# Patient Record
Sex: FEMALE | Race: WHITE | Employment: FULL TIME | URBAN - METROPOLITAN AREA
[De-identification: names, ages, dates, MRNs, and addresses within clinical notes are randomized per-mention and may not be internally consistent; named-entity substitution may affect disease eponyms.]

---

## 2017-03-03 ENCOUNTER — EMPLOYEE WELLNESS (OUTPATIENT)
Dept: OTHER | Age: 42
End: 2017-03-03

## 2018-03-20 VITALS — WEIGHT: 233 LBS | BODY MASS INDEX: 38.77 KG/M2

## 2018-05-16 ENCOUNTER — EMPLOYEE WELLNESS (OUTPATIENT)
Dept: OTHER | Age: 43
End: 2018-05-16

## 2018-05-16 LAB
CHOLESTEROL, TOTAL: 231 MG/DL (ref 0–199)
GLUCOSE BLD-MCNC: 111 MG/DL (ref 74–107)
HDLC SERPL-MCNC: 40 MG/DL
LDL CHOLESTEROL CALCULATED: 152 MG/DL (ref 0–99)
TRIGL SERPL-MCNC: 194 MG/DL (ref 0–149)

## 2018-05-21 VITALS — WEIGHT: 221 LBS | BODY MASS INDEX: 36.78 KG/M2

## 2018-06-14 ENCOUNTER — OFFICE VISIT (OUTPATIENT)
Dept: FAMILY MEDICINE CLINIC | Age: 43
End: 2018-06-14
Payer: COMMERCIAL

## 2018-06-14 VITALS
HEART RATE: 91 BPM | TEMPERATURE: 97.3 F | OXYGEN SATURATION: 97 % | DIASTOLIC BLOOD PRESSURE: 73 MMHG | BODY MASS INDEX: 36.49 KG/M2 | HEIGHT: 65 IN | WEIGHT: 219 LBS | SYSTOLIC BLOOD PRESSURE: 123 MMHG

## 2018-06-14 DIAGNOSIS — Z12.31 ENCOUNTER FOR SCREENING MAMMOGRAM FOR BREAST CANCER: ICD-10-CM

## 2018-06-14 DIAGNOSIS — R73.03 PREDIABETES: Primary | ICD-10-CM

## 2018-06-14 DIAGNOSIS — E66.09 CLASS 2 OBESITY DUE TO EXCESS CALORIES WITHOUT SERIOUS COMORBIDITY WITH BODY MASS INDEX (BMI) OF 36.0 TO 36.9 IN ADULT: ICD-10-CM

## 2018-06-14 LAB — HBA1C MFR BLD: 6.2 %

## 2018-06-14 PROCEDURE — 83036 HEMOGLOBIN GLYCOSYLATED A1C: CPT | Performed by: FAMILY MEDICINE

## 2018-06-14 PROCEDURE — 99213 OFFICE O/P EST LOW 20 MIN: CPT | Performed by: FAMILY MEDICINE

## 2018-06-14 ASSESSMENT — PATIENT HEALTH QUESTIONNAIRE - PHQ9
SUM OF ALL RESPONSES TO PHQ9 QUESTIONS 1 & 2: 0
SUM OF ALL RESPONSES TO PHQ QUESTIONS 1-9: 0
2. FEELING DOWN, DEPRESSED OR HOPELESS: 0
1. LITTLE INTEREST OR PLEASURE IN DOING THINGS: 0

## 2018-06-18 ASSESSMENT — ENCOUNTER SYMPTOMS
ABDOMINAL PAIN: 0
NAUSEA: 0
DIARRHEA: 0
COUGH: 0
VOMITING: 0
CONSTIPATION: 0
SHORTNESS OF BREATH: 0

## 2018-07-30 ENCOUNTER — HOSPITAL ENCOUNTER (OUTPATIENT)
Dept: GENERAL RADIOLOGY | Age: 43
Discharge: HOME | End: 2018-08-01
Payer: COMMERCIAL

## 2018-07-30 DIAGNOSIS — Z12.31 ENCOUNTER FOR SCREENING MAMMOGRAM FOR BREAST CANCER: ICD-10-CM

## 2018-07-30 PROCEDURE — 77063 BREAST TOMOSYNTHESIS BI: CPT

## 2019-03-21 ENCOUNTER — HOSPITAL ENCOUNTER (EMERGENCY)
Age: 44
Discharge: HOME | End: 2019-03-21
Payer: COMMERCIAL

## 2019-03-21 ENCOUNTER — NURSE TRIAGE (OUTPATIENT)
Dept: OTHER | Facility: CLINIC | Age: 44
End: 2019-03-21

## 2019-03-21 VITALS
DIASTOLIC BLOOD PRESSURE: 72 MMHG | OXYGEN SATURATION: 92 % | WEIGHT: 230 LBS | RESPIRATION RATE: 16 BRPM | BODY MASS INDEX: 38.32 KG/M2 | SYSTOLIC BLOOD PRESSURE: 134 MMHG | HEART RATE: 98 BPM | HEIGHT: 65 IN | TEMPERATURE: 98.4 F

## 2019-03-21 DIAGNOSIS — G44.039 EPISODIC PAROXYSMAL HEMICRANIA, NOT INTRACTABLE: Primary | ICD-10-CM

## 2019-03-21 DIAGNOSIS — R11.11 NON-INTRACTABLE VOMITING WITHOUT NAUSEA, UNSPECIFIED VOMITING TYPE: ICD-10-CM

## 2019-03-21 DIAGNOSIS — R11.0 NAUSEA: ICD-10-CM

## 2019-03-21 LAB
ANION GAP SERPL CALCULATED.3IONS-SCNC: 13 MMOL/L (ref 7–16)
BASOPHILS ABSOLUTE: 0.07 E9/L (ref 0–0.2)
BASOPHILS RELATIVE PERCENT: 1 % (ref 0–2)
BUN BLDV-MCNC: 16 MG/DL (ref 6–20)
CALCIUM SERPL-MCNC: 9.3 MG/DL (ref 8.6–10.2)
CHLORIDE BLD-SCNC: 101 MMOL/L (ref 98–107)
CHOLESTEROL, TOTAL: 262 MG/DL (ref 0–199)
CO2: 23 MMOL/L (ref 22–29)
CREAT SERPL-MCNC: 0.6 MG/DL (ref 0.5–1)
EOSINOPHILS ABSOLUTE: 0.29 E9/L (ref 0.05–0.5)
EOSINOPHILS RELATIVE PERCENT: 4 % (ref 0–6)
GFR AFRICAN AMERICAN: >60
GFR NON-AFRICAN AMERICAN: >60 ML/MIN/1.73
GLUCOSE BLD-MCNC: 234 MG/DL (ref 74–99)
HCT VFR BLD CALC: 42.7 % (ref 34–48)
HDLC SERPL-MCNC: 43 MG/DL
HEMOGLOBIN: 14.6 G/DL (ref 11.5–15.5)
IMMATURE GRANULOCYTES #: 0.04 E9/L
IMMATURE GRANULOCYTES %: 0.6 % (ref 0–5)
LDL CHOLESTEROL CALCULATED: 177 MG/DL (ref 0–99)
LYMPHOCYTES ABSOLUTE: 1.79 E9/L (ref 1.5–4)
LYMPHOCYTES RELATIVE PERCENT: 24.9 % (ref 20–42)
MCH RBC QN AUTO: 30.5 PG (ref 26–35)
MCHC RBC AUTO-ENTMCNC: 34.2 % (ref 32–34.5)
MCV RBC AUTO: 89.3 FL (ref 80–99.9)
MONOCYTES ABSOLUTE: 0.32 E9/L (ref 0.1–0.95)
MONOCYTES RELATIVE PERCENT: 4.4 % (ref 2–12)
NEUTROPHILS ABSOLUTE: 4.69 E9/L (ref 1.8–7.3)
NEUTROPHILS RELATIVE PERCENT: 65.1 % (ref 43–80)
PDW BLD-RTO: 12.3 FL (ref 11.5–15)
PLATELET # BLD: 357 E9/L (ref 130–450)
PMV BLD AUTO: 9.6 FL (ref 7–12)
POTASSIUM SERPL-SCNC: 4.3 MMOL/L (ref 3.5–5)
RBC # BLD: 4.78 E12/L (ref 3.5–5.5)
SODIUM BLD-SCNC: 137 MMOL/L (ref 132–146)
TRIGL SERPL-MCNC: 209 MG/DL (ref 0–149)
VLDLC SERPL CALC-MCNC: 42 MG/DL
WBC # BLD: 7.2 E9/L (ref 4.5–11.5)

## 2019-03-21 PROCEDURE — 80048 BASIC METABOLIC PNL TOTAL CA: CPT

## 2019-03-21 PROCEDURE — 99284 EMERGENCY DEPT VISIT MOD MDM: CPT

## 2019-03-21 PROCEDURE — 36415 COLL VENOUS BLD VENIPUNCTURE: CPT

## 2019-03-21 PROCEDURE — 96372 THER/PROPH/DIAG INJ SC/IM: CPT

## 2019-03-21 PROCEDURE — 96375 TX/PRO/DX INJ NEW DRUG ADDON: CPT

## 2019-03-21 PROCEDURE — 85025 COMPLETE CBC W/AUTO DIFF WBC: CPT

## 2019-03-21 PROCEDURE — 80061 LIPID PANEL: CPT

## 2019-03-21 PROCEDURE — 6360000002 HC RX W HCPCS: Performed by: NURSE PRACTITIONER

## 2019-03-21 PROCEDURE — 2500000003 HC RX 250 WO HCPCS: Performed by: NURSE PRACTITIONER

## 2019-03-21 PROCEDURE — 96374 THER/PROPH/DIAG INJ IV PUSH: CPT

## 2019-03-21 PROCEDURE — 2580000003 HC RX 258: Performed by: NURSE PRACTITIONER

## 2019-03-21 RX ORDER — PROMETHAZINE HYDROCHLORIDE 25 MG/1
25 TABLET ORAL EVERY 6 HOURS PRN
Qty: 20 TABLET | Refills: 0 | Status: SHIPPED | OUTPATIENT
Start: 2019-03-21 | End: 2019-03-26

## 2019-03-21 RX ORDER — 0.9 % SODIUM CHLORIDE 0.9 %
500 INTRAVENOUS SOLUTION INTRAVENOUS ONCE
Status: COMPLETED | OUTPATIENT
Start: 2019-03-21 | End: 2019-03-21

## 2019-03-21 RX ORDER — ONDANSETRON 2 MG/ML
8 INJECTION INTRAMUSCULAR; INTRAVENOUS ONCE
Status: COMPLETED | OUTPATIENT
Start: 2019-03-21 | End: 2019-03-21

## 2019-03-21 RX ORDER — KETOROLAC TROMETHAMINE 10 MG/1
10 TABLET, FILM COATED ORAL EVERY 6 HOURS PRN
Qty: 20 TABLET | Refills: 0 | Status: SHIPPED | OUTPATIENT
Start: 2019-03-21 | End: 2019-08-14

## 2019-03-21 RX ORDER — KETOROLAC TROMETHAMINE 30 MG/ML
30 INJECTION, SOLUTION INTRAMUSCULAR; INTRAVENOUS ONCE
Status: COMPLETED | OUTPATIENT
Start: 2019-03-21 | End: 2019-03-21

## 2019-03-21 RX ORDER — PROMETHAZINE HYDROCHLORIDE 25 MG/ML
25 INJECTION, SOLUTION INTRAMUSCULAR; INTRAVENOUS ONCE
Status: COMPLETED | OUTPATIENT
Start: 2019-03-21 | End: 2019-03-21

## 2019-03-21 RX ORDER — DIPHENHYDRAMINE HYDROCHLORIDE 50 MG/ML
25 INJECTION INTRAMUSCULAR; INTRAVENOUS ONCE
Status: COMPLETED | OUTPATIENT
Start: 2019-03-21 | End: 2019-03-21

## 2019-03-21 RX ADMIN — SODIUM CHLORIDE 500 ML: 9 INJECTION, SOLUTION INTRAVENOUS at 12:40

## 2019-03-21 RX ADMIN — DIPHENHYDRAMINE HYDROCHLORIDE 25 MG: 50 INJECTION, SOLUTION INTRAMUSCULAR; INTRAVENOUS at 12:40

## 2019-03-21 RX ADMIN — KETOROLAC TROMETHAMINE 30 MG: 30 INJECTION, SOLUTION INTRAMUSCULAR; INTRAVENOUS at 12:41

## 2019-03-21 RX ADMIN — PROMETHAZINE HYDROCHLORIDE 25 MG: 25 INJECTION INTRAMUSCULAR; INTRAVENOUS at 12:14

## 2019-03-21 RX ADMIN — ONDANSETRON HYDROCHLORIDE 8 MG: 2 SOLUTION INTRAMUSCULAR; INTRAVENOUS at 12:51

## 2019-03-21 RX ADMIN — FAMOTIDINE 20 MG: 10 INJECTION, SOLUTION INTRAVENOUS at 12:40

## 2019-03-21 ASSESSMENT — PAIN SCALES - GENERAL
PAINLEVEL_OUTOF10: 6
PAINLEVEL_OUTOF10: 6
PAINLEVEL_OUTOF10: 0

## 2019-03-21 ASSESSMENT — PAIN DESCRIPTION - PAIN TYPE: TYPE: ACUTE PAIN

## 2019-03-21 ASSESSMENT — PAIN DESCRIPTION - DESCRIPTORS: DESCRIPTORS: ACHING

## 2019-03-21 ASSESSMENT — PAIN DESCRIPTION - LOCATION: LOCATION: HEAD

## 2019-06-07 ENCOUNTER — HOSPITAL ENCOUNTER (EMERGENCY)
Age: 44
Discharge: HOME | End: 2019-06-07
Payer: COMMERCIAL

## 2019-06-07 ENCOUNTER — NURSE TRIAGE (OUTPATIENT)
Dept: OTHER | Facility: CLINIC | Age: 44
End: 2019-06-07

## 2019-06-07 VITALS
WEIGHT: 230 LBS | RESPIRATION RATE: 20 BRPM | BODY MASS INDEX: 38.32 KG/M2 | HEART RATE: 109 BPM | DIASTOLIC BLOOD PRESSURE: 90 MMHG | SYSTOLIC BLOOD PRESSURE: 164 MMHG | HEIGHT: 65 IN | TEMPERATURE: 97.8 F | OXYGEN SATURATION: 97 %

## 2019-06-07 DIAGNOSIS — Z91.030 BEE STING ALLERGY: Primary | ICD-10-CM

## 2019-06-07 PROCEDURE — 6360000002 HC RX W HCPCS: Performed by: NURSE PRACTITIONER

## 2019-06-07 PROCEDURE — 96375 TX/PRO/DX INJ NEW DRUG ADDON: CPT

## 2019-06-07 PROCEDURE — 2500000003 HC RX 250 WO HCPCS: Performed by: NURSE PRACTITIONER

## 2019-06-07 PROCEDURE — 99281 EMR DPT VST MAYX REQ PHY/QHP: CPT

## 2019-06-07 PROCEDURE — 2580000003 HC RX 258: Performed by: NURSE PRACTITIONER

## 2019-06-07 PROCEDURE — 96374 THER/PROPH/DIAG INJ IV PUSH: CPT

## 2019-06-07 RX ORDER — FAMOTIDINE 20 MG/1
20 TABLET, FILM COATED ORAL 2 TIMES DAILY
Qty: 14 TABLET | Refills: 0 | Status: SHIPPED | OUTPATIENT
Start: 2019-06-07 | End: 2019-08-14

## 2019-06-07 RX ORDER — DIPHENHYDRAMINE HYDROCHLORIDE 50 MG/ML
25 INJECTION INTRAMUSCULAR; INTRAVENOUS ONCE
Status: COMPLETED | OUTPATIENT
Start: 2019-06-07 | End: 2019-06-07

## 2019-06-07 RX ORDER — DEXAMETHASONE SODIUM PHOSPHATE 10 MG/ML
10 INJECTION INTRAMUSCULAR; INTRAVENOUS ONCE
Status: COMPLETED | OUTPATIENT
Start: 2019-06-07 | End: 2019-06-07

## 2019-06-07 RX ORDER — PREDNISONE 10 MG/1
40 TABLET ORAL DAILY
Qty: 20 TABLET | Refills: 0 | Status: SHIPPED | OUTPATIENT
Start: 2019-06-07 | End: 2019-06-12

## 2019-06-07 RX ORDER — DIPHENHYDRAMINE HCL 25 MG
25 CAPSULE ORAL EVERY 6 HOURS PRN
Qty: 20 CAPSULE | Refills: 0 | Status: SHIPPED | OUTPATIENT
Start: 2019-06-07 | End: 2019-06-17

## 2019-06-07 RX ORDER — 0.9 % SODIUM CHLORIDE 0.9 %
1000 INTRAVENOUS SOLUTION INTRAVENOUS ONCE
Status: COMPLETED | OUTPATIENT
Start: 2019-06-07 | End: 2019-06-07

## 2019-06-07 RX ADMIN — FAMOTIDINE 20 MG: 10 INJECTION, SOLUTION INTRAVENOUS at 12:47

## 2019-06-07 RX ADMIN — DIPHENHYDRAMINE HYDROCHLORIDE 25 MG: 50 INJECTION, SOLUTION INTRAMUSCULAR; INTRAVENOUS at 12:47

## 2019-06-07 RX ADMIN — DEXAMETHASONE SODIUM PHOSPHATE 10 MG: 10 INJECTION INTRAMUSCULAR; INTRAVENOUS at 12:47

## 2019-06-07 RX ADMIN — SODIUM CHLORIDE 1000 ML: 9 INJECTION, SOLUTION INTRAVENOUS at 12:47

## 2019-06-07 NOTE — TELEPHONE ENCOUNTER
Reason for Disposition   Wheezing or difficulty breathing    Protocols used: BEE STING-ADULT-OH    Spoke with pt directly regarding a reported wasp sting. States that sting was around 20- 30 minutes ago. Pt has known allergy to bee venom. Originally denies difficulty breathing and chest pain but as writer was speaking with caller her breathing began to sound very labored, increased and slightly wheezing. Pt verbalizes that she is having an increased in difficulty breathing. Writer advises to hang up and call 911. Caller also instructed to use epi pen as prescribed. Pt states that her  as well as children are with her at this time. When called back, caller had dialed EMS.

## 2019-06-07 NOTE — ED PROVIDER NOTES
[facility]     Department of Emergency Medicine   ED  Provider Note  Admit Date/RoomTime: 2019 12:22 PM  ED Room: /   Chief Complaint   Insect Bite (pt with bee sting about 1 1/2 hrs ago to right side shoulder area- gave epi at 1045. no sob noted. pulse ox 98% on room air. )    History of Present Illness   Source of history provided by:  patient. History/Exam Limitations: none. Maira Martinez is a 39 y.o. old female who has a past medical history of:   Past Medical History:   Diagnosis Date    Acute medial meniscus tear of right knee 6/3/2015    MRI 2015     Anxiety     Depression     Depression     Marital problems. Previously on Lexapro.  Headache(784.0)     HTN     Previously on medication.  HTN     Migraine     Preeclampsia     Previous 2 pregnancies.  Preeclampsia     presents to the emergency department by private vehicle, for a wasp bite to right axilla, which occured 2 hour(s) prior to arrival.  The complaint is associated with hives. Since onset the symptoms have been persistent. She denies difficulty breathing, difficulty swallowing, wheezing, throat tightness or hoarseness. The patient has a history of no similar reactions. Tetanus Status:  up to date. Additional Symptoms:      Drainage:   no.     Abrasion:   no.     Pustule:   no.     Puncture:   yes. ROS    Pertinent positives and negatives are stated within HPI, all other systems reviewed and are negative. Past Surgical History:  has a past surgical history that includes  section (); Tubal ligation; Tubal ligation (); LEEP (); and Hysterectomy. Social History:  reports that she has never smoked. She has never used smokeless tobacco. She reports that she does not drink alcohol or use drugs.   Family History: family history includes Breast Cancer in her mother; Cancer in her father; Cancer (age of onset: 32) in her sister; Diabetes in her mother; Heart Disease (age to providing specific details for the plan of care and counseling regarding the diagnosis and prognosis. Questions are answered at this time and they are agreeable with the plan. Assessment      1. Bee sting allergy      Plan   Discharge to home  Patient condition is stable    New Medications     New Prescriptions    No medications on file     Electronically signed by GERALDINE Barreto CNP   DD: 6/7/19  **This report was transcribed using voice recognition software. Every effort was made to ensure accuracy; however, inadvertent computerized transcription errors may be present.   END OF ED PROVIDER NOTE        GERALDINE Barreto CNP  06/07/19 [address] GERALDINE Nash CNP  06/07/19 [id number]

## 2019-06-30 ENCOUNTER — NURSE TRIAGE (OUTPATIENT)
Dept: OTHER | Facility: CLINIC | Age: 44
End: 2019-06-30

## 2019-06-30 NOTE — TELEPHONE ENCOUNTER
Reason for Disposition   [1] Continuous (nonstop) coughing interferes with work or school AND [2] no improvement using cough treatment per Care Advice    Protocols used: COUGH - ACUTE PRODUCTIVE-ADULT-AH    Caller reports that she began with a productive cough that began two days ago.  Provided local urgent cares in area per patient request.

## 2019-07-12 ENCOUNTER — OFFICE VISIT (OUTPATIENT)
Dept: FAMILY MEDICINE CLINIC | Age: 44
End: 2019-07-12
Payer: COMMERCIAL

## 2019-07-12 VITALS
WEIGHT: 230 LBS | RESPIRATION RATE: 16 BRPM | HEIGHT: 65 IN | TEMPERATURE: 97.6 F | BODY MASS INDEX: 38.32 KG/M2 | OXYGEN SATURATION: 95 % | SYSTOLIC BLOOD PRESSURE: 139 MMHG | DIASTOLIC BLOOD PRESSURE: 84 MMHG | HEART RATE: 107 BPM

## 2019-07-12 DIAGNOSIS — E66.09 CLASS 2 OBESITY DUE TO EXCESS CALORIES WITHOUT SERIOUS COMORBIDITY WITH BODY MASS INDEX (BMI) OF 38.0 TO 38.9 IN ADULT: ICD-10-CM

## 2019-07-12 DIAGNOSIS — Z78.9 IMMUNE TO HEPATITIS B: ICD-10-CM

## 2019-07-12 DIAGNOSIS — Z11.3 SCREEN FOR STD (SEXUALLY TRANSMITTED DISEASE): ICD-10-CM

## 2019-07-12 DIAGNOSIS — R05.8 POST-VIRAL COUGH SYNDROME: Primary | ICD-10-CM

## 2019-07-12 DIAGNOSIS — Z11.4 ENCOUNTER FOR SCREENING FOR HIV: ICD-10-CM

## 2019-07-12 DIAGNOSIS — Z78.9 IMMUNE TO VARICELLA: ICD-10-CM

## 2019-07-12 DIAGNOSIS — Z11.1 VISIT FOR TB SKIN TEST: ICD-10-CM

## 2019-07-12 DIAGNOSIS — E07.89 THYROID FULLNESS: ICD-10-CM

## 2019-07-12 DIAGNOSIS — Z23 NEED FOR DIPHTHERIA-TETANUS-PERTUSSIS (TDAP) VACCINE: ICD-10-CM

## 2019-07-12 DIAGNOSIS — Z78.9 HEPATITIS A IMMUNE: ICD-10-CM

## 2019-07-12 PROCEDURE — 99212 OFFICE O/P EST SF 10 MIN: CPT | Performed by: STUDENT IN AN ORGANIZED HEALTH CARE EDUCATION/TRAINING PROGRAM

## 2019-07-12 PROCEDURE — 6360000002 HC RX W HCPCS

## 2019-07-12 PROCEDURE — 90715 TDAP VACCINE 7 YRS/> IM: CPT

## 2019-07-12 PROCEDURE — 99396 PREV VISIT EST AGE 40-64: CPT | Performed by: STUDENT IN AN ORGANIZED HEALTH CARE EDUCATION/TRAINING PROGRAM

## 2019-07-12 PROCEDURE — 90471 IMMUNIZATION ADMIN: CPT

## 2019-07-12 RX ORDER — BENZONATATE 100 MG/1
100 CAPSULE ORAL 3 TIMES DAILY PRN
Qty: 30 CAPSULE | Refills: 0 | Status: SHIPPED | OUTPATIENT
Start: 2019-07-12 | End: 2019-07-19

## 2019-07-13 ASSESSMENT — ENCOUNTER SYMPTOMS
WHEEZING: 0
DIARRHEA: 0
SHORTNESS OF BREATH: 0
SORE THROAT: 0
TROUBLE SWALLOWING: 0
VOICE CHANGE: 0
RHINORRHEA: 1
CONSTIPATION: 0
COUGH: 1

## 2019-07-15 ENCOUNTER — NURSE ONLY (OUTPATIENT)
Dept: FAMILY MEDICINE CLINIC | Age: 44
End: 2019-07-15
Payer: COMMERCIAL

## 2019-07-15 ENCOUNTER — HOSPITAL ENCOUNTER (OUTPATIENT)
Age: 44
Discharge: HOME | End: 2019-07-15
Payer: COMMERCIAL

## 2019-07-15 DIAGNOSIS — Z78.9 IMMUNE TO VARICELLA: ICD-10-CM

## 2019-07-15 DIAGNOSIS — E66.09 CLASS 2 OBESITY DUE TO EXCESS CALORIES WITHOUT SERIOUS COMORBIDITY WITH BODY MASS INDEX (BMI) OF 38.0 TO 38.9 IN ADULT: ICD-10-CM

## 2019-07-15 DIAGNOSIS — Z78.9 HEPATITIS A IMMUNE: ICD-10-CM

## 2019-07-15 DIAGNOSIS — Z78.9 IMMUNE TO HEPATITIS B: ICD-10-CM

## 2019-07-15 DIAGNOSIS — E07.89 THYROID FULLNESS: ICD-10-CM

## 2019-07-15 DIAGNOSIS — Z11.3 SCREEN FOR STD (SEXUALLY TRANSMITTED DISEASE): ICD-10-CM

## 2019-07-15 DIAGNOSIS — Z11.4 ENCOUNTER FOR SCREENING FOR HIV: ICD-10-CM

## 2019-07-15 LAB
HBA1C MFR BLD: 7.2 % (ref 4–5.6)
RPR: NORMAL
TSH SERPL DL<=0.05 MIU/L-ACNC: 0.92 UIU/ML (ref 0.27–4.2)

## 2019-07-15 PROCEDURE — 86317 IMMUNOASSAY INFECTIOUS AGENT: CPT

## 2019-07-15 PROCEDURE — 36415 COLL VENOUS BLD VENIPUNCTURE: CPT

## 2019-07-15 PROCEDURE — 86703 HIV-1/HIV-2 1 RESULT ANTBDY: CPT

## 2019-07-15 PROCEDURE — 84443 ASSAY THYROID STIM HORMONE: CPT

## 2019-07-15 PROCEDURE — 86592 SYPHILIS TEST NON-TREP QUAL: CPT

## 2019-07-15 PROCEDURE — 86709 HEPATITIS A IGM ANTIBODY: CPT

## 2019-07-15 PROCEDURE — 86787 VARICELLA-ZOSTER ANTIBODY: CPT

## 2019-07-15 PROCEDURE — 83036 HEMOGLOBIN GLYCOSYLATED A1C: CPT

## 2019-07-16 LAB
HAV IGM SER IA-ACNC: NORMAL
HIV-1 AND HIV-2 ANTIBODIES: NORMAL
VARICELLA-ZOSTER VIRUS AB, IGG: NORMAL

## 2019-07-18 LAB
Lab: NORMAL
REPORT: NORMAL
THIS TEST SENT TO: NORMAL

## 2019-08-14 ENCOUNTER — OFFICE VISIT (OUTPATIENT)
Dept: FAMILY MEDICINE CLINIC | Age: 44
End: 2019-08-14
Payer: COMMERCIAL

## 2019-08-14 VITALS
DIASTOLIC BLOOD PRESSURE: 79 MMHG | BODY MASS INDEX: 38.15 KG/M2 | WEIGHT: 229 LBS | TEMPERATURE: 98.4 F | HEART RATE: 100 BPM | OXYGEN SATURATION: 96 % | HEIGHT: 65 IN | SYSTOLIC BLOOD PRESSURE: 116 MMHG

## 2019-08-14 DIAGNOSIS — Z12.39 BREAST CANCER SCREENING: ICD-10-CM

## 2019-08-14 DIAGNOSIS — Z01.84 IMMUNITY STATUS TESTING: ICD-10-CM

## 2019-08-14 DIAGNOSIS — R05.8 POST-VIRAL COUGH SYNDROME: ICD-10-CM

## 2019-08-14 DIAGNOSIS — R73.09 ELEVATED HEMOGLOBIN A1C: Primary | ICD-10-CM

## 2019-08-14 PROCEDURE — 99212 OFFICE O/P EST SF 10 MIN: CPT | Performed by: STUDENT IN AN ORGANIZED HEALTH CARE EDUCATION/TRAINING PROGRAM

## 2019-08-14 PROCEDURE — 99213 OFFICE O/P EST LOW 20 MIN: CPT | Performed by: STUDENT IN AN ORGANIZED HEALTH CARE EDUCATION/TRAINING PROGRAM

## 2019-08-14 ASSESSMENT — ENCOUNTER SYMPTOMS
ABDOMINAL PAIN: 0
SHORTNESS OF BREATH: 0
COUGH: 1
WHEEZING: 0
VOMITING: 0
DIARRHEA: 0

## 2019-08-14 NOTE — PROGRESS NOTES
daily walk after dinner as a means to start out increase activity. Patient discussed with me that she has access to our employee gym, and that she is willing to incorporate walking at lunchtime while the weather is nice. I expressed to her the importance of eating more vegetables, decreasing carbohydrates and processed sugars. Patient expressed understanding. Immunity status testing  -     Measles/Mumps/Rubella Immunity; Future    Breast cancer screening  -     JAYDE DIGITAL SCREEN W CAD BILATERAL; Future    Post-viral cough syndrome   - Continue hydration with water and use cough drops as needed. Maintenance     Health Maintenance Due   Topic Date Due    Diabetic foot exam  10/27/1984    Diabetic retinal exam  10/27/1984    Diabetic microalbuminuria test  10/27/1992    Breast cancer screen  07/30/2019          Reviewed age and gender appropriate health screening exams and vaccinations. Advised patient regarding importance of keeping up withrecommended health maintenance and to schedule as soon as possible if overdue, as this is important in assessing for undiagnosed pathology, especially cancer, as well as protecting against potentially harmful/lifethreatening disease. Refilled all appropriate medications today. RTO in 4m    No future appointments. Signed by : Yonatan Lyles M.D., PGY-2    This case was discussedwith Dr Eric Rodriguez (s)        This note was dictated with Dragon Software. This note, in part or full,may have been transcribed using voice recognition software. Every effort was made to ensure accuracy; however, inadvertent computerized transcription errors may be present. Please excuse any transcriptional grammatical or spelling errors that may have escaped my editorial review.

## 2019-08-15 ENCOUNTER — HOSPITAL ENCOUNTER (OUTPATIENT)
Age: 44
Discharge: HOME | End: 2019-08-15
Payer: COMMERCIAL

## 2019-08-15 DIAGNOSIS — Z01.84 IMMUNITY STATUS TESTING: ICD-10-CM

## 2019-08-15 PROCEDURE — 86735 MUMPS ANTIBODY: CPT

## 2019-08-15 PROCEDURE — 86765 RUBEOLA ANTIBODY: CPT

## 2019-08-15 PROCEDURE — 86762 RUBELLA ANTIBODY: CPT

## 2019-08-15 PROCEDURE — 36415 COLL VENOUS BLD VENIPUNCTURE: CPT

## 2019-08-20 LAB
MEASLES IMMUNE (IGG): NORMAL
MUMPS AB IGG: NORMAL
RUBELLA ANTIBODY IGG: NORMAL

## 2019-08-22 ENCOUNTER — TELEPHONE (OUTPATIENT)
Dept: FAMILY MEDICINE CLINIC | Age: 44
End: 2019-08-22

## 2019-08-22 DIAGNOSIS — Z01.84 LACK OF IMMUNITY TO HEPATITIS B VIRUS DEMONSTRATED BY SEROLOGIC TEST: Primary | ICD-10-CM

## 2019-08-23 ENCOUNTER — NURSE ONLY (OUTPATIENT)
Dept: FAMILY MEDICINE CLINIC | Age: 44
End: 2019-08-23
Payer: COMMERCIAL

## 2019-08-23 DIAGNOSIS — Z11.1 PPD SCREENING TEST: Primary | ICD-10-CM

## 2019-08-23 DIAGNOSIS — Z23 NEED FOR HEPATITIS B VACCINATION: ICD-10-CM

## 2019-08-23 PROCEDURE — G0010 ADMIN HEPATITIS B VACCINE: HCPCS

## 2019-08-23 PROCEDURE — 90740 HEPB VACC 3 DOSE IMMUNSUP IM: CPT

## 2019-08-23 PROCEDURE — 99211 OFF/OP EST MAY X REQ PHY/QHP: CPT

## 2019-08-23 PROCEDURE — 6360000002 HC RX W HCPCS

## 2019-08-23 NOTE — PROGRESS NOTES
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

## 2019-08-26 ENCOUNTER — NURSE ONLY (OUTPATIENT)
Dept: FAMILY MEDICINE CLINIC | Age: 44
End: 2019-08-26
Payer: COMMERCIAL

## 2019-08-26 DIAGNOSIS — Z11.1 TUBERCULOSIS SCREENING: ICD-10-CM

## 2019-08-26 PROCEDURE — 99211 OFF/OP EST MAY X REQ PHY/QHP: CPT

## 2019-08-26 PROCEDURE — 86580 TB INTRADERMAL TEST: CPT

## 2019-08-28 ENCOUNTER — NURSE ONLY (OUTPATIENT)
Dept: FAMILY MEDICINE CLINIC | Age: 44
End: 2019-08-28
Payer: COMMERCIAL

## 2019-08-28 ENCOUNTER — TELEPHONE (OUTPATIENT)
Dept: FAMILY MEDICINE CLINIC | Age: 44
End: 2019-08-28

## 2019-08-28 DIAGNOSIS — Z11.1 SCREENING FOR TUBERCULOSIS: Primary | ICD-10-CM

## 2019-08-28 LAB
INDURATION: 0
TB SKIN TEST: NEGATIVE

## 2019-08-28 NOTE — TELEPHONE ENCOUNTER
Patient had step 1 TB test completed this week. Will need order for step 2 to be completed next week.

## 2019-09-16 ENCOUNTER — NURSE ONLY (OUTPATIENT)
Dept: FAMILY MEDICINE CLINIC | Age: 44
End: 2019-09-16
Payer: COMMERCIAL

## 2019-09-16 DIAGNOSIS — Z23 IMMUNIZATION DUE: ICD-10-CM

## 2019-09-16 PROCEDURE — 99211 OFF/OP EST MAY X REQ PHY/QHP: CPT

## 2019-09-16 PROCEDURE — 2500000003 HC RX 250 WO HCPCS

## 2019-09-16 PROCEDURE — 86580 TB INTRADERMAL TEST: CPT

## 2019-09-18 ENCOUNTER — NURSE ONLY (OUTPATIENT)
Dept: FAMILY MEDICINE CLINIC | Age: 44
End: 2019-09-18
Payer: COMMERCIAL

## 2019-09-18 DIAGNOSIS — Z11.1 SCREENING FOR TUBERCULOSIS: Primary | ICD-10-CM

## 2019-09-19 LAB
INDURATION: 0
TB SKIN TEST: NEGATIVE

## 2019-09-20 ENCOUNTER — OFFICE VISIT (OUTPATIENT)
Dept: FAMILY MEDICINE CLINIC | Age: 44
End: 2019-09-20
Payer: COMMERCIAL

## 2019-09-20 VITALS
DIASTOLIC BLOOD PRESSURE: 76 MMHG | HEART RATE: 106 BPM | SYSTOLIC BLOOD PRESSURE: 120 MMHG | OXYGEN SATURATION: 95 % | WEIGHT: 230 LBS | TEMPERATURE: 99 F | HEIGHT: 65 IN | BODY MASS INDEX: 38.32 KG/M2

## 2019-09-20 DIAGNOSIS — H00.014 HORDEOLUM EXTERNUM OF LEFT UPPER EYELID: Primary | ICD-10-CM

## 2019-09-20 PROCEDURE — 99213 OFFICE O/P EST LOW 20 MIN: CPT | Performed by: PHYSICIAN ASSISTANT

## 2019-09-20 RX ORDER — TOBRAMYCIN 3 MG/ML
SOLUTION/ DROPS OPHTHALMIC
Qty: 5 ML | Refills: 0 | Status: SHIPPED
Start: 2019-09-20 | End: 2020-09-08

## 2019-09-20 NOTE — PROGRESS NOTES
99 °F (37.2 °C) (Oral)   Ht 5' 5\" (1.651 m)   Wt 230 lb (104.3 kg)   LMP 03/01/2012   SpO2 95%   BMI 38.27 kg/m²    Oxygen Saturation Interpretation: Normal.    Constitutional:  Alert, development consistent with age. HENT:  NC/NT. Airway patent. Eyes:         Pupils: PERRL bilaterally. Eyelids:  Mild edema to the left upper lid. There is a small hordeolum noted over the left upper lid with mild TTP. Conjunctiva: No erythema noted to the conjunctiva. Sclera: Clear bilaterally. No obvious FB, rust ring, or hyphema. Cornea: No obvious corneal abrasion or ulceration bilaterally. EOM:  Intact bilaterally. Visual Acuity:  Grossly intact. Lungs: CTAB without wheezing, rales, or rhonchi  Heart: RRR, no murmurs, rubs, or gallops. Skin: Moist and warm without rashes or lesions. Lymphatics: No lymphangitis or adenopathy noted. Neurological:  Alert and oriented. Motor functions intact. Lab / Imaging Results   (All laboratory and radiology results have been personally reviewed by myself)    Imaging: All Radiology results interpreted by Radiologist unless otherwise noted. No orders to display         Assessment / Plan     Impression(s):  1. Hordeolum externum of left upper eyelid      Disposition:  Disposition: Discharge to home. Script written for tobramycin ophthalmic drops, side effects and application directions discussed. Advise warm compresses 3-4 times/day for symptomatic relief. F/u with ophthalmology in 48 hrs if not improved. ED sooner if symptoms worsen or change. ED immediately with the development of fever, visual changes, spreading erythema, ocular pain/swelling, body aches, or shaking chills. Pt is in agreement with this care plan. All questions answered.

## 2019-09-23 ENCOUNTER — TELEPHONE (OUTPATIENT)
Dept: FAMILY MEDICINE CLINIC | Age: 44
End: 2019-09-23

## 2019-09-25 ENCOUNTER — TELEPHONE (OUTPATIENT)
Dept: FAMILY MEDICINE CLINIC | Age: 44
End: 2019-09-25

## 2019-09-26 ENCOUNTER — NURSE ONLY (OUTPATIENT)
Dept: FAMILY MEDICINE CLINIC | Age: 44
End: 2019-09-26
Payer: COMMERCIAL

## 2019-09-26 DIAGNOSIS — Z23 NEED FOR HEPATITIS B VACCINATION: Primary | ICD-10-CM

## 2019-09-26 PROCEDURE — 90740 HEPB VACC 3 DOSE IMMUNSUP IM: CPT

## 2019-09-26 PROCEDURE — G0010 ADMIN HEPATITIS B VACCINE: HCPCS

## 2019-09-26 PROCEDURE — 6360000002 HC RX W HCPCS

## 2019-09-26 PROCEDURE — 90746 HEPB VACCINE 3 DOSE ADULT IM: CPT

## 2019-10-01 ENCOUNTER — PATIENT MESSAGE (OUTPATIENT)
Dept: FAMILY MEDICINE CLINIC | Age: 44
End: 2019-10-01

## 2020-07-29 ENCOUNTER — EMPLOYEE WELLNESS (OUTPATIENT)
Dept: OTHER | Age: 45
End: 2020-07-29

## 2020-07-29 LAB
CHOLESTEROL, TOTAL: 230 MG/DL (ref 0–199)
GLUCOSE BLD-MCNC: 275 MG/DL (ref 74–107)
HDLC SERPL-MCNC: 38 MG/DL
LDL CHOLESTEROL CALCULATED: 155 MG/DL (ref 0–99)
TRIGL SERPL-MCNC: 184 MG/DL (ref 0–149)

## 2020-09-05 ENCOUNTER — NURSE TRIAGE (OUTPATIENT)
Dept: OTHER | Facility: CLINIC | Age: 45
End: 2020-09-05

## 2020-09-05 NOTE — TELEPHONE ENCOUNTER
Patient called in on the [facility] line to report headache/pressure 5/10, dry cough, posterior neck pain 4/10, and fever of 101.0 oral temp at 0930, took tylenol and it went down to an oral temp of 100.0. She states she is concerned for COVID, see triage assessment below. Care Advice provided; patient acknowledged understanding of care advice and is in agreement with plan. [employer] [facility] form filled out. Employee's manager e mailed. Number given, then transferred to [facility]- [phone]    Reason for Disposition   [1] COVID-19 infection suspected by caller or triager AND [2] mild symptoms (cough, fever, or others) AND [9] no complications or SOB    Answer Assessment - Initial Assessment Questions  1. COVID-19 DIAGNOSIS: \"Who made your Coronavirus (COVID-19) diagnosis? \" \"Was it confirmed by a positive lab test?\" If not diagnosed by a HCP, ask \"Are there lots of cases (community spread) where you live? \" (See public health department website, if unsure)     Patient has not been  tested  2. ONSET: \"When did the COVID-19 symptoms start? \"       9/4/20  3. WORST SYMPTOM: \"What is your worst symptom? \" (e.g., cough, fever, shortness of breath, muscle aches)      Head pressure 5/10  4. COUGH: \"Do you have a cough? \" If so, ask: \"How bad is the cough? \"        Moderate cough  5. FEVER: \"Do you have a fever? \" If so, ask: \"What is your temperature, how was it measured, and when did it start? \"     Yes, 101.0 oral temp at 0930; down to 100.0 after tylenol  6. RESPIRATORY STATUS: \"Describe your breathing? \" (e.g., shortness of breath, wheezing, unable to speak)       Normal  7. BETTER-SAME-WORSE: Soraida Loredo you getting better, staying the same or getting worse compared to yesterday? \"  If getting worse, ask, \"In what way? \"      Same  8. HIGH RISK DISEASE: \"Do you have any chronic medical problems? \" (e.g., asthma, heart or lung disease, weak immune system, etc.)      Denies  9. PREGNANCY: \"Is there any chance you are pregnant? \"

## 2020-09-08 ENCOUNTER — HOSPITAL ENCOUNTER (OUTPATIENT)
Age: 45
Discharge: HOME | End: 2020-09-10
Payer: COMMERCIAL

## 2020-09-08 ENCOUNTER — OFFICE VISIT (OUTPATIENT)
Dept: PRIMARY CARE CLINIC | Age: 45
End: 2020-09-08
Payer: COMMERCIAL

## 2020-09-08 VITALS
HEART RATE: 111 BPM | OXYGEN SATURATION: 98 % | TEMPERATURE: 99.1 F | SYSTOLIC BLOOD PRESSURE: 136 MMHG | WEIGHT: 230 LBS | DIASTOLIC BLOOD PRESSURE: 88 MMHG | BODY MASS INDEX: 38.32 KG/M2 | HEIGHT: 65 IN

## 2020-09-08 PROCEDURE — U0003 INFECTIOUS AGENT DETECTION BY NUCLEIC ACID (DNA OR RNA); SEVERE ACUTE RESPIRATORY SYNDROME CORONAVIRUS 2 (SARS-COV-2) (CORONAVIRUS DISEASE [COVID-19]), AMPLIFIED PROBE TECHNIQUE, MAKING USE OF HIGH THROUGHPUT TECHNOLOGIES AS DESCRIBED BY CMS-2020-01-R: HCPCS

## 2020-09-08 PROCEDURE — 99213 OFFICE O/P EST LOW 20 MIN: CPT | Performed by: NURSE PRACTITIONER

## 2020-09-08 RX ORDER — AZITHROMYCIN 250 MG/1
TABLET, FILM COATED ORAL
Qty: 6 TABLET | Refills: 0 | Status: SHIPPED | OUTPATIENT
Start: 2020-09-08 | End: 2020-09-13

## 2020-09-08 RX ORDER — ASCORBIC ACID 500 MG
500 TABLET ORAL 2 TIMES DAILY
Qty: 14 TABLET | Refills: 0 | Status: SHIPPED | OUTPATIENT
Start: 2020-09-08 | End: 2020-09-15

## 2020-09-08 RX ORDER — ZINC SULFATE 50(220)MG
50 CAPSULE ORAL DAILY
Qty: 7 CAPSULE | Refills: 0 | COMMUNITY
Start: 2020-09-08 | End: 2020-09-15

## 2020-09-08 NOTE — TELEPHONE ENCOUNTER
[facility] for [facility] and management of her current symptoms. After this illness, she will need to follow up in our office as well, in approximately 4 weeks. Thank you!

## 2020-09-08 NOTE — PATIENT INSTRUCTIONS
Patient Education        Learning About Being High-Risk for COVID-19  Who is at high risk? COVID-19 causes a mild illness in many people who have it. But certain things may increase your risk for more serious illness. These include:  · Being age 72 or older. · Smoking. · Living in a long-term care facility. · Having ongoing serious health issues. Some examples are:  ? Chronic lung disease or asthma. ? Heart problems. ? A weakened immune system. ? Cancer treatment. ? Diabetes. This is not a complete list. If you have a chronic health problem, ask your doctor if you should take extra precautions during the outbreak. If you are pregnant, it's safest to consider yourself at higher risk. It's not known yet whether COVID-19 is dangerous for you or your baby. But pregnancy increases the risk for serious illness from viruses similar to COVID-19. How do you stay safe? · Stay home. ? Stay home as much as you can. This may be the easiest way to avoid exposure, as long as no one else in your household has the virus. ? If there are a lot of COVID-19 cases in your community, do not leave your home except to seek medical care. ? Limit visitors right now. It's especially important to avoid contact with anyone who is sick or who might have been exposed. Remember that people may have been exposed without knowing it or having any symptoms. ? Have enough food, medicines, and other supplies on hand so that you don't have to go out. Try some of these options if you don't have what you need. ? Use delivery and takeout services for groceries and meals. ? Have a healthy family member, friend, or neighbor shop for you. ? Ask your doctor for extra prescription medicine. ? Routinely clean and disinfect high-touch surfaces. These include countertops, faucets, door handles, doorknobs, and phones. ? Do not travel. · Wash your hands often and well. ? Wash your hands often, especially after you cough or sneeze.  Use soap and water, and scrub for at least 20 seconds. If soap and water aren't available, use an alcohol-based hand . · Be extra careful if you have to go out. ? Avoid crowds and crowded places. Try to keep 6 feet of space between yourself and others. ? Don't use public transportation, ride-shares, or taxis unless you have no choice. ? Try not to touch things that many other people have touched. Door handles, elevator buttons, shopping cart handles, and handrails on escalators get a lot of touches. ? Carry tissues or paper towels with you. If you must touch something, you'll be able to protect your hands. ? Don't shake hands with anyone. Try a friendly wave instead. ? Don't touch your face, and wash your hands often. ? Wash your hands again as soon as you get home. · Know when to call your doctor. ? Call a doctor if you have symptoms of COVID-19 (fever, cough, shortness of breath). If you are told to get testing or care and must go out, wear a cloth face cover. Current as of: May 8, 2020               Content Version: 12.5  © 5181-7787 [employer]. Care instructions adapted under license by [facility] ([facility]). If you have questions about a medical condition or this instruction, always ask your healthcare professional. [address] any warranty or liability for your use of this information. Patient Education        Viral Infections: Care Instructions  Your Care Instructions     You don't feel well, but it's not clear what's causing it. You may have a viral infection. Viruses cause many illnesses, such as the common cold, influenza, fever, rashes, and the diarrhea, nausea, and vomiting that are often called \"stomach flu. \" You may wonder if antibiotic medicines could make you feel better. But antibiotics only treat infections caused by bacteria. They don't work on viruses. The good news is that viral infections usually aren't serious.  Most will go away in a few days without medical treatment. In the meantime, there are a few things you can do to make yourself more comfortable. Follow-up care is a key part of your treatment and safety. Be sure to make and go to all appointments, and call your doctor if you are having problems. It's also a good idea to know your test results and keep a list of the medicines you take. How can you care for yourself at home? · Get plenty of rest if you feel tired. · Take an over-the-counter pain medicine if needed, such as acetaminophen (Tylenol), ibuprofen (Advil, Motrin), or naproxen (Aleve). Read and follow all instructions on the label. · Be careful when taking over-the-counter cold or flu medicines and Tylenol at the same time. Many of these medicines have acetaminophen, which is Tylenol. Read the labels to make sure that you are not taking more than the recommended dose. Too much acetaminophen (Tylenol) can be harmful. · Drink plenty of fluids, enough so that your urine is light yellow or clear like water. If you have kidney, heart, or liver disease and have to limit fluids, talk with your doctor before you increase the amount of fluids you drink. · Stay home from work, school, and other public places while you have a fever. When should you call for help? [id number] anytime you think you may need emergency care. For example, call if:  · You have severe trouble breathing. · You passed out (lost consciousness). Call your doctor now or seek immediate medical care if:  · You seem to be getting much sicker. · You have a new or higher fever. · You have blood in your stools. · You have new belly pain, or your pain gets worse. · You have a new rash. Watch closely for changes in your health, and be sure to contact your doctor if:  · You start to get better and then get worse. · You do not get better as expected. Where can you learn more? Go to [email or web address].[email or web address]. org and sign in to your [employer] account.  Enter D074 in the Search Health Information box to learn more about \"Viral Infections: Care Instructions. \"     If you do not have an account, please click on the \"Sign Up Now\" link. Current as of: February 11, 2020               Content Version: 12.5  © 7052-6811 [employer]. Care instructions adapted under license by [facility] ([facility]). If you have questions about a medical condition or this instruction, always ask your healthcare professional. [address] any warranty or liability for your use of this information.

## 2020-09-08 NOTE — PROGRESS NOTES
Kuldeep Cheema  1975    Chief Complaint   Patient presents with    Fatigue     started four days ago     Headache    Generalized Body Aches    Fever     high 101       Respiratory Symptoms:  Patient complains of 4 day(s) history of fever: 100.5-101., headache, clear nasal discharge, shortness of breath and productive cough: Sputum is white. Symptoms have been unchanged with time. She denies sore throat. She does have known exposure to a Covid patient. She has been taking tylenol and ibuprofen for the fever. She reports that she does have mild relief from this. She does have a remote history of pneumonia in 2008. Relevant PMH: No pertinent PMH. Smoking history:  She  reports that she has never smoked. She has never used smokeless tobacco.     She has had ill contacts with URI symptoms. Treatment to date: none. Travel screen completed: Yes            Vitals:    09/08/20 1126   BP: 136/88   Site: Right Lower Arm   Position: Sitting   Pulse: 111   Temp: 99.1 °F (37.3 °C)   TempSrc: Oral   SpO2: 98%   Weight: 230 lb (104.3 kg)   Height: 5' 5\" (1.651 m)      Physical Exam  Constitutional:       Appearance: She is well-developed. HENT:      Head: Normocephalic. Eyes:      Pupils: Pupils are equal, round, and reactive to light. Neck:      Musculoskeletal: Normal range of motion and neck supple. Thyroid: No thyromegaly. Cardiovascular:      Rate and Rhythm: Normal rate and regular rhythm. Pulmonary:      Effort: Pulmonary effort is normal.      Breath sounds: Normal breath sounds. Abdominal:      General: Bowel sounds are normal.      Palpations: Abdomen is soft. Musculoskeletal: Normal range of motion. Lymphadenopathy:      Cervical: No cervical adenopathy. Skin:     General: Skin is warm and dry. Neurological:      Mental Status: She is alert and oriented to person, place, and time. Psychiatric:         Behavior: Behavior normal.              Assessment/Plan:  1. Suspected COVID-19 virus infection    - COVID-19 Ambulatory; Future  - azithromycin (ZITHROMAX) 250 MG tablet; Take 2 tablets by mouth daily for 1 day, THEN 1 tablet daily for 4 days. Dispense: 6 tablet; Refill: 0  - vitamin C (ASCORBIC ACID) 500 MG tablet; Take 1 tablet by mouth 2 times daily for 7 days  Dispense: 14 tablet; Refill: 0  - zinc sulfate (ZINCATE) 220 (50 Zn) MG capsule; Take 1 capsule by mouth daily for 7 days  Dispense: 7 capsule; Refill: 0      Advised of current Covid-19 pandemic and lack of availability of testing. It is possible that this could be a covid infection and as such certain precautions/isolation should be followed. Gave CDC info on both flu and Covid-19 precautions including remaining at home while sick and avoiding people who could possibly develop severe infection if exposed. GERALDINE Mares - CNP  9/8/20     This visit was provided as a focused evaluation during the COVID -19 pandemic/national emergency. A comprehensive review of all previous patient history and testing was not conducted. Pertinent findings were elicited during the visit.

## 2020-09-10 LAB
SARS-COV-2: DETECTED
SOURCE: ABNORMAL

## 2020-09-11 ENCOUNTER — TELEPHONE (OUTPATIENT)
Dept: FAMILY MEDICINE CLINIC | Age: 45
End: 2020-09-11

## 2020-09-11 NOTE — TELEPHONE ENCOUNTER
Please call this patient. She had been diagnosed with COVID-19 and needs follow up. She had placed a request for an e-visit, which is less appropriate for her current illness and symptoms, so the e-visit physician sent me a message to follow up. Please schedule Ms. Acevedo for a VIRTUAL VISIT with any available physician to discuss her symptoms and follow up. She may be scheduled with me on Monday morning for a virtual visit (may add on at the beginning or end of office hours), or she may discuss her symptoms sooner with an available physician at any time if she continues to have shortness of breath or any concerns. We will need to discuss her symptoms by virtual visit to determine next steps and return to work. Due to her COVID diagnosis, and because we are a non-COVID office, it will need to be a virtual visit. However, if she needs to be seen in person, she may return to a [facility] or to the ED if need be. Also, her screens from [facility] showed that her glucose (sugar) was quite high, and we need to address this. Please let me know when she is scheduled for a virtual visit. Thank you!

## 2020-10-19 VITALS — BODY MASS INDEX: 37.94 KG/M2 | WEIGHT: 228 LBS

## 2020-12-23 ENCOUNTER — HOSPITAL ENCOUNTER (EMERGENCY)
Age: 45
Discharge: HOME | End: 2020-12-23
Attending: EMERGENCY MEDICINE
Payer: COMMERCIAL

## 2020-12-23 ENCOUNTER — APPOINTMENT (OUTPATIENT)
Dept: GENERAL RADIOLOGY | Age: 45
End: 2020-12-23
Payer: COMMERCIAL

## 2020-12-23 ENCOUNTER — NURSE TRIAGE (OUTPATIENT)
Dept: OTHER | Facility: CLINIC | Age: 45
End: 2020-12-23

## 2020-12-23 VITALS
WEIGHT: 230 LBS | TEMPERATURE: 98.2 F | DIASTOLIC BLOOD PRESSURE: 84 MMHG | BODY MASS INDEX: 38.32 KG/M2 | HEART RATE: 94 BPM | RESPIRATION RATE: 16 BRPM | OXYGEN SATURATION: 98 % | SYSTOLIC BLOOD PRESSURE: 129 MMHG | HEIGHT: 65 IN

## 2020-12-23 LAB
ALBUMIN SERPL-MCNC: 4.4 G/DL (ref 3.5–5.2)
ALP BLD-CCNC: 116 U/L (ref 35–104)
ALT SERPL-CCNC: 16 U/L (ref 0–32)
ANION GAP SERPL CALCULATED.3IONS-SCNC: 10 MMOL/L (ref 7–16)
AST SERPL-CCNC: 12 U/L (ref 0–31)
BASOPHILS ABSOLUTE: 0.05 E9/L (ref 0–0.2)
BASOPHILS RELATIVE PERCENT: 0.7 % (ref 0–2)
BILIRUB SERPL-MCNC: 0.7 MG/DL (ref 0–1.2)
BUN BLDV-MCNC: 15 MG/DL (ref 6–20)
CALCIUM SERPL-MCNC: 9.7 MG/DL (ref 8.6–10.2)
CHLORIDE BLD-SCNC: 98 MMOL/L (ref 98–107)
CHP ED QC CHECK: YES
CO2: 24 MMOL/L (ref 22–29)
CREAT SERPL-MCNC: 0.5 MG/DL (ref 0.5–1)
D DIMER: <200 NG/ML DDU
EOSINOPHILS ABSOLUTE: 0.21 E9/L (ref 0.05–0.5)
EOSINOPHILS RELATIVE PERCENT: 2.7 % (ref 0–6)
GFR AFRICAN AMERICAN: >60
GFR NON-AFRICAN AMERICAN: >60 ML/MIN/1.73
GLUCOSE BLD-MCNC: 258 MG/DL
GLUCOSE BLD-MCNC: 323 MG/DL (ref 74–99)
HCT VFR BLD CALC: 44.5 % (ref 34–48)
HEMOGLOBIN: 14.9 G/DL (ref 11.5–15.5)
IMMATURE GRANULOCYTES #: 0.02 E9/L
IMMATURE GRANULOCYTES %: 0.3 % (ref 0–5)
LYMPHOCYTES ABSOLUTE: 0.62 E9/L (ref 1.5–4)
LYMPHOCYTES RELATIVE PERCENT: 8.1 % (ref 20–42)
MAGNESIUM: 1.9 MG/DL (ref 1.6–2.6)
MCH RBC QN AUTO: 29.4 PG (ref 26–35)
MCHC RBC AUTO-ENTMCNC: 33.5 % (ref 32–34.5)
MCV RBC AUTO: 87.9 FL (ref 80–99.9)
METER GLUCOSE: 258 MG/DL (ref 74–99)
MONOCYTES ABSOLUTE: 0.29 E9/L (ref 0.1–0.95)
MONOCYTES RELATIVE PERCENT: 3.8 % (ref 2–12)
NEUTROPHILS ABSOLUTE: 6.47 E9/L (ref 1.8–7.3)
NEUTROPHILS RELATIVE PERCENT: 84.4 % (ref 43–80)
PDW BLD-RTO: 12.6 FL (ref 11.5–15)
PLATELET # BLD: 273 E9/L (ref 130–450)
PMV BLD AUTO: 9.8 FL (ref 7–12)
POTASSIUM SERPL-SCNC: 4.4 MMOL/L (ref 3.5–5)
PRO-BNP: 16 PG/ML (ref 0–125)
RBC # BLD: 5.06 E12/L (ref 3.5–5.5)
SODIUM BLD-SCNC: 132 MMOL/L (ref 132–146)
TOTAL CK: 58 U/L (ref 20–180)
TOTAL PROTEIN: 6.6 G/DL (ref 6.4–8.3)
TROPONIN: <0.01 NG/ML (ref 0–0.03)
WBC # BLD: 7.7 E9/L (ref 4.5–11.5)

## 2020-12-23 PROCEDURE — 80053 COMPREHEN METABOLIC PANEL: CPT

## 2020-12-23 PROCEDURE — 82962 GLUCOSE BLOOD TEST: CPT

## 2020-12-23 PROCEDURE — 2580000003 HC RX 258: Performed by: EMERGENCY MEDICINE

## 2020-12-23 PROCEDURE — 83880 ASSAY OF NATRIURETIC PEPTIDE: CPT

## 2020-12-23 PROCEDURE — 84484 ASSAY OF TROPONIN QUANT: CPT

## 2020-12-23 PROCEDURE — 71046 X-RAY EXAM CHEST 2 VIEWS: CPT

## 2020-12-23 PROCEDURE — 85378 FIBRIN DEGRADE SEMIQUANT: CPT

## 2020-12-23 PROCEDURE — 85025 COMPLETE CBC W/AUTO DIFF WBC: CPT

## 2020-12-23 PROCEDURE — 36415 COLL VENOUS BLD VENIPUNCTURE: CPT

## 2020-12-23 PROCEDURE — 83735 ASSAY OF MAGNESIUM: CPT

## 2020-12-23 PROCEDURE — 82550 ASSAY OF CK (CPK): CPT

## 2020-12-23 PROCEDURE — 99284 EMERGENCY DEPT VISIT MOD MDM: CPT

## 2020-12-23 RX ORDER — 0.9 % SODIUM CHLORIDE 0.9 %
500 INTRAVENOUS SOLUTION INTRAVENOUS ONCE
Status: COMPLETED | OUTPATIENT
Start: 2020-12-23 | End: 2020-12-23

## 2020-12-23 RX ORDER — 0.9 % SODIUM CHLORIDE 0.9 %
1000 INTRAVENOUS SOLUTION INTRAVENOUS ONCE
Status: COMPLETED | OUTPATIENT
Start: 2020-12-23 | End: 2020-12-23

## 2020-12-23 RX ADMIN — SODIUM CHLORIDE 1000 ML: 9 INJECTION, SOLUTION INTRAVENOUS at 11:33

## 2020-12-23 RX ADMIN — SODIUM CHLORIDE 500 ML: 9 INJECTION, SOLUTION INTRAVENOUS at 09:13

## 2020-12-23 ASSESSMENT — PAIN SCALES - GENERAL: PAINLEVEL_OUTOF10: 0

## 2020-12-23 NOTE — ED PROVIDER NOTES
Department of Emergency Medicine   ED  Provider Note  Admit Date/RoomTime: 2020  8:41 AM  ED Room: [facility]          History of Present Illness:  20, Time: 9:29 AM EST  Chief Complaint   Patient presents with    Fatigue     Recieved COVID vaccine last night. Body feels very heavy, feels like shes going to pass out. Tia Olea is a 54 y.o. female presenting to the ED for fatigue after receiving COVID-19 vaccine, beginning 1 day. The complaint has been intermittent, mild in severity, and worsened by light exertion. Patient presents for fatigue shortness of breath and body heaviness after receiving COVID-19 vaccine yesterday. 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 She was previously infected with COVID-19 in 2020. She complains of mild shortness of breath body heaviness myalgias. Denies chest pain syncope, did complain of some lightheadedness and near syncope on exertion today. Took Tylenol this morning. Denies fevers. Review of Systems:   Pertinent positives and negatives are stated within HPI, all other systems reviewed and are negative.        --------------------------------------------- PAST HISTORY ---------------------------------------------  Past Medical History:  has a past medical history of Acute medial meniscus tear of right knee, Anxiety, Depression, Depression, Headache(784.0), HTN, HTN, Migraine, Preeclampsia, and Preeclampsia. Past Surgical History:  has a past surgical history that includes  section (); Tubal ligation; Tubal ligation (); LEEP (); and Hysterectomy. Social History:  reports that she has never smoked. She has never used smokeless tobacco. She reports that she does not drink alcohol or use drugs. Family History: family history includes Breast Cancer in her mother; Cancer in her father; Cancer (age of onset: 32) in her sister; Diabetes in her mother; Heart Disease (age of onset: 48) in her father; High Blood Pressure in her mother; Thyroid Disease in her mother. . Unless otherwise noted, family history is non contributory    The patients home medications have been reviewed. Allergies: Bee venom and Onion        ---------------------------------------------------PHYSICAL EXAM--------------------------------------    Constitutional/General: Alert and oriented x3  Head: Normocephalic and atraumatic  Eyes: PERRL, EOMI, sclera non icteric  Mouth: Oropharynx clear, handling secretions, no trismus, no asymmetry of the posterior oropharynx or uvular edema  Neck: Supple, full ROM, no stridor, no meningeal signs no JVD   Respiratory: Lungs clear to auscultation bilaterally,Not in respiratory distress  Cardiovascular: Tachycardic and regular 2+ distal pulses. Equal extremity pulses. Chest: No chest wall tenderness  GI:  Abdomen Soft, Non tender, Non distended. No rebound, guarding, or rigidity. Musculoskeletal: Moves all extremities x 4. Warm and well perfused, no clubbing, cyanosis, or edema. Capillary refill <3 seconds  Integument: skin warm and dry. No rashes. Neurologic: GCS 15, no focal deficits, symmetric strength 5/5 in the upper and lower extremities bilaterally There is no pretibial edema nor calf tenderness bilaterally     Psychiatric: Normal Affect      EKG: Interpreted by emergency department physician, Dr. Adolph Sousa   This EKG is signed and interpreted by me.     Rate: 111  Rhythm: Sinus  Interpretation: Sinus rhythm, normal axis, NC is 160, QRS is 94, QTc is 459, nonspecific changes that are stable compared to November 2014  Comparison: stable as compared to patient's most recent EKG      -------------------------------------------------- RESULTS ------------------------------------------------- I have personally reviewed all laboratory and imaging results for this patient. Results are listed below.      LABS: (Lab results interpreted by me)  Results for orders placed or performed during the hospital encounter of 12/23/20   Troponin   Result Value Ref Range    Troponin <0.01 0.00 - 0.03 ng/mL   CBC Auto Differential   Result Value Ref Range    WBC 7.7 4.5 - 11.5 E9/L    RBC 5.06 3.50 - 5.50 E12/L    Hemoglobin 14.9 11.5 - 15.5 g/dL    Hematocrit 44.5 34.0 - 48.0 %    MCV 87.9 80.0 - 99.9 fL    MCH 29.4 26.0 - 35.0 pg    MCHC 33.5 32.0 - 34.5 %    RDW 12.6 11.5 - 15.0 fL    Platelets 314 660 - 684 E9/L    MPV 9.8 7.0 - 12.0 fL    Neutrophils % 84.4 (H) 43.0 - 80.0 %    Immature Granulocytes % 0.3 0.0 - 5.0 %    Lymphocytes % 8.1 (L) 20.0 - 42.0 %    Monocytes % 3.8 2.0 - 12.0 %    Eosinophils % 2.7 0.0 - 6.0 %    Basophils % 0.7 0.0 - 2.0 %    Neutrophils Absolute 6.47 1.80 - 7.30 E9/L    Immature Granulocytes # 0.02 E9/L    Lymphocytes Absolute 0.62 (L) 1.50 - 4.00 E9/L    Monocytes Absolute 0.29 0.10 - 0.95 E9/L    Eosinophils Absolute 0.21 0.05 - 0.50 E9/L    Basophils Absolute 0.05 0.00 - 0.20 E9/L   Comprehensive Metabolic Panel   Result Value Ref Range    Sodium 132 132 - 146 mmol/L    Potassium 4.4 3.5 - 5.0 mmol/L    Chloride 98 98 - 107 mmol/L    CO2 24 22 - 29 mmol/L    Anion Gap 10 7 - 16 mmol/L    Glucose 323 (H) 74 - 99 mg/dL    BUN 15 6 - 20 mg/dL    CREATININE 0.5 0.5 - 1.0 mg/dL    GFR Non-African American >60 >=60 mL/min/1.73    GFR African American >60     Calcium 9.7 8.6 - 10.2 mg/dL    Total Protein 6.6 6.4 - 8.3 g/dL    Alb 4.4 3.5 - 5.2 g/dL    Total Bilirubin 0.7 0.0 - 1.2 mg/dL    Alkaline Phosphatase 116 (H) 35 - 104 U/L    ALT 16 0 - 32 U/L    AST 12 0 - 31 U/L   Brain Natriuretic Peptide   Result Value Ref Range    Pro-BNP 16 0 - 125 pg/mL   Magnesium   Result Value Ref Range    Magnesium 1.9 1.6 - 2.6 mg/dL   D-Dimer, Quantitative   Result Value Ref Range D-Dimer, Quant <200 ng/mL DDU   CK   Result Value Ref Range    Total CK 58 20 - 180 U/L   POCT Glucose   Result Value Ref Range    Glucose 258 mg/dL    QC OK? yes    POCT Glucose   Result Value Ref Range    Meter Glucose 258 (H) 74 - 99 mg/dL   EKG 12 Lead   Result Value Ref Range    Ventricular Rate 111 BPM    Atrial Rate 111 BPM    P-R Interval 160 ms    QRS Duration 94 ms    Q-T Interval 338 ms    QTc Calculation (Bazett) 459 ms    P Axis 65 degrees    R Axis -2 degrees    T Axis 40 degrees   ,       RADIOLOGY:  Interpreted by Radiologist unless otherwise specified  XR CHEST (2 VW)   Final Result   Normal chest                       ------------------------- NURSING NOTES AND VITALS REVIEWED ---------------------------   The nursing notes within the ED encounter and vital signs as below have been reviewed by myself  /84   Pulse 94   Temp 98.2 °F (36.8 °C)   Resp 16   Ht 5' 5\" (1.651 m)   Wt 230 lb (104.3 kg)   LMP 03/01/2012   SpO2 98%   BMI 38.27 kg/m²     Oxygen Saturation Interpretation: Normal    The cardiac monitor revealed NSr with ST with a heart rate in the 100s as interpreted by me. The cardiac monitor was ordered secondary to the patient's heart rate and to monitor the patient for dysrhythmia. CPT 32209    The patients available past medical records and past encounters were reviewed.         ------------------------------ ED COURSE/MEDICAL DECISION MAKING----------------------  Medications   0.9 % sodium chloride bolus (0 mLs Intravenous Stopped 12/23/20 0958)   0.9 % sodium chloride bolus (0 mLs Intravenous Stopped 12/23/20 1212)                    Medical Decision Making:     I, Dr. Adolph Sousa am the primary provider of record Fatigue after vaccination, metabolic work-up reassuring. Glucose noted to be over 300, patient states she does not presently have a diagnosis of diabetes that she and her PCP have been watching it. Blood sugar improved after IV hydration. Discussed the importance of follow-up with primary care. She states understanding agreement      Re-Evaluations:          Re-evaluation. Patients symptoms are improving  Repeat physical examination is improved        This patient's ED course included: a personal history and physicial examination, re-evaluation prior to disposition, IV medications, cardiac monitoring, continuous pulse oximetry and complex medical decision making and emergency management    This patient has remained hemodynamically stable during their ED course. Counseling: The emergency provider has spoken with the patient and discussed todays results, in addition to providing specific details for the plan of care and counseling regarding the diagnosis and prognosis. Questions are answered at this time and they are agreeable with the plan.       --------------------------------- IMPRESSION AND DISPOSITION ---------------------------------    IMPRESSION  1. Fatigue, unspecified type    2. Hyperglycemia        DISPOSITION  Disposition: Discharge to home  Patient condition is stable        NOTE: This report was transcribed using voice recognition software.  Every effort was made to ensure accuracy; however, inadvertent computerized transcription errors may be present       Edwar Ferris DO  12/24/20 9923

## 2020-12-23 NOTE — TELEPHONE ENCOUNTER
Reason for Disposition   MODERATE difficulty breathing (e.g., speaks in phrases, SOB even at rest, pulse 100-120)    Answer Assessment - Initial Assessment Questions  1. COVID-19 DIAGNOSIS: \"Who made your Coronavirus (COVID-19) diagnosis? \" \"Was it confirmed by a positive lab test?\" If not diagnosed by a HCP, ask \"Are there lots of cases (community spread) where you live? \" (See public health department website, if unsure)        Unsure    2. COVID-19 EXPOSURE: \"Was there any known exposure to COVID before the symptoms began? \" CDC Definition of close contact: within 6 feet (2 meters) for a total of 15 minutes or more over a 24-hour period. COVID vaccine     3. ONSET: \"When did the COVID-19 symptoms start? \"       This morning    4. WORST SYMPTOM: \"What is your worst symptom? \" (e.g., cough, fever, shortness of breath, muscle aches)       Fatigue    5. COUGH: \"Do you have a cough? \" If so, ask: \"How bad is the cough? \"         None    6. FEVER: \"Do you have a fever? \" If so, ask: \"What is your temperature, how was it measured, and when did it start? \"      No    7. RESPIRATORY STATUS: \"Describe your breathing? \" (e.g., shortness of breath, wheezing, unable to speak)       Shortness of breath    8. BETTER-SAME-WORSE: Alessia Cordial you getting better, staying the same or getting worse compared to yesterday? \"  If getting worse, ask, \"In what way? \"      Began this morning    9. HIGH RISK DISEASE: \"Do you have any chronic medical problems? \" (e.g., asthma, heart or lung disease, weak immune system, obesity, etc.)       Hypertension and Diabetes- diet management       10. OTHER SYMPTOMS: \"Do you have any other symptoms? 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  Caller was informed to be evaluated at the ED now and to call back for additional questions or if worsening and agreed. Answer Assessment - Initial Assessment Questions  1. SYMPTOMS: \"What is the main symptom? \" (e.g., redness, swelling, pain)         COVID vaccine, pain at injection site    2. ONSET: \"When was the vaccine (shot) given? \" \"How much later did the *No Answer* begin? \" (e.g., hours, days ago)       Yesterday    3. SEVERITY: \"How bad is it? \"       Minor    4. FEVER: \"Is there a fever? \" If so, ask: \"What is it, how was it measured, and when did it start? \"       None    5. IMMUNIZATIONS GIVEN: \"What shots have you recently received? \"       DTH-53    6. PAST REACTIONS: \"Have you reacted to immunizations before? \" If so, ask: \"What happened? \"      One year after the flu shot, felt the next day like she was hung over about 8-9 years ago    7. OTHER SYMPTOMS: \"Do you have any other symptoms? \"      none    Protocols used: CORONAVIRUS (COVID-19) DIAGNOSED OR SUSPECTED-ADULT-AH, IMMUNIZATION REACTIONS-ADULT-AH

## 2020-12-23 NOTE — LETTER
[address] [facility]  [address]  [place] [address]  Phone: [phone]               December 23, 2020    Patient: Josy Lynn   YOB: 1975   Date of Visit: 12/23/2020       To Whom It May Concern:    Josy Lynn was seen and treated in our emergency department on 12/23/2020. She may return to work on 12/24/20.       Sincerely,       Vadim Crain DO         Signature:__________________________________

## 2020-12-24 ENCOUNTER — OFFICE VISIT (OUTPATIENT)
Dept: PRIMARY CARE CLINIC | Age: 45
End: 2020-12-24
Payer: COMMERCIAL

## 2020-12-24 ENCOUNTER — TELEPHONE (OUTPATIENT)
Dept: ADMINISTRATIVE | Age: 45
End: 2020-12-24

## 2020-12-24 VITALS
DIASTOLIC BLOOD PRESSURE: 61 MMHG | TEMPERATURE: 97.5 F | HEART RATE: 105 BPM | SYSTOLIC BLOOD PRESSURE: 115 MMHG | OXYGEN SATURATION: 98 %

## 2020-12-24 LAB
CHP ED QC CHECK: NORMAL
GLUCOSE BLD-MCNC: 298 MG/DL

## 2020-12-24 PROCEDURE — 82962 GLUCOSE BLOOD TEST: CPT | Performed by: NURSE PRACTITIONER

## 2020-12-24 PROCEDURE — 99213 OFFICE O/P EST LOW 20 MIN: CPT | Performed by: NURSE PRACTITIONER

## 2020-12-24 NOTE — PROGRESS NOTES
Chief Complaint   Fever (103.5 temp yesterday), Generalized Body Aches (felt like a lot of pressure on her), and Other (yesteday while she was at work she felt like she was going to pass out, went to ER and her white count was up and her sugar was in the 300's, they gave her fluids and she felt better)      History of Present Illness   Source of history provided by:  patientKelvin Esquivel is a 54 y.o. old female who presents to the flu clinic for fever, body aches and elevated blood sugar. Patient reports she works at [employer] and received her COVID vaccination on 2020. She states yesterday she had a fever of 103.5, body aches and felt like she was going to pass out. She was evaluated in the ED and diagnosed with fatigue and hyperglycemia. Patient reports that she was supposed to be watching her diet and sugars due to an elevated HbA1c in 2019. She reports she woke up today without a fever just some light body aches, but everything is improving from yesterday. Denies any current fever, chills, chest pain, SOB, or acute loss of taste/smell. ROS   Pertinent positives and negatives are stated within HPI, all other systems reviewed and are negative. Past Medical History:  has a past medical history of Acute medial meniscus tear of right knee, Anxiety, Depression, Depression, Headache(784.0), HTN, HTN, Migraine, Preeclampsia, and Preeclampsia. Past Surgical History:  has a past surgical history that includes  section (); Tubal ligation; Tubal ligation (); LEEP (); and Hysterectomy. Social History:  reports that she has never smoked. She has never used smokeless tobacco. She reports that she does not drink alcohol or use drugs. Family History: family history includes Breast Cancer in her mother; Cancer in her father; Cancer (age of onset: 32) in her sister; Diabetes in her mother; Heart Disease (age of onset: 48) in her father; High Blood Pressure in her mother; Thyroid Disease in her mother. Allergies: Bee venom and Onion    Physical Exam   Vital Signs:  /61 (Site: Left Upper Arm, Position: Sitting, Cuff Size: Large Adult)   Pulse 105   Temp 97.5 °F (36.4 °C) (Oral)   LMP 03/01/2012   SpO2 98%    Oxygen Saturation Interpretation: Normal.    Constitutional:  Alert, development consistent with age. NAD. Head:  NC/NT. Airway patent. Ears: TMs clear bilaterally. Canals without exudate or swelling bilaterally. Mouth: Posterior pharynx with mild erythema and clear postnasal drip. No tonsillar hypertrophy or exudate. Neck:  Normal ROM. Supple. No anterior cervical adenopathy noted. Lungs: CTAB without wheezes, rales, or rhonchi. CV:  Regular rate and rhythm, normal heart sounds, without pathological murmurs, ectopy, gallops, or rubs. Skin:  Normal turgor. Warm, dry, without visible rash. Lymphatic: No lymphangitis or adenopathy noted. Neurological:  Oriented. Motor functions intact. Lab / Imaging Results   (All laboratory and radiology results have been personally reviewed by myself)  Labs:  Results for orders placed or performed in visit on 12/24/20   POCT Glucose   Result Value Ref Range    Glucose 298 mg/dL    QC OK? Imaging: All Radiology results interpreted by Radiologist unless otherwise noted. Xr Chest (2 Vw)    Result Date: 12/23/2020  EXAMINATION: TWO XRAY VIEWS OF THE CHEST 12/23/2020 9:54 am COMPARISON: 11/03/2014 HISTORY: ORDERING SYSTEM PROVIDED HISTORY: SOB TECHNOLOGIST PROVIDED HISTORY: Reason for exam:->SOB What reading provider will be dictating this exam?->CRC FINDINGS: Heart size is normal.  There are no infiltrates or effusions.     Normal chest      Medical Decision Making Pt non-toxic, in no apparent distress and stable at time of discharge. Assessment/Plan   Betsy Brandon was seen today for fever, generalized body aches and other. Diagnoses and all orders for this visit:    Post-vaccination fever  - Tylenol or Ibuprofen as needed     Postimmunization reaction, initial encounter  - Tylenol or Ibuprofen as needed    Hyperglycemia  -     POCT Glucose  - Call PCP on Monday and schedule an appointment to be seen for evaluation of DM and need for possible medications. Patient advised this is likely side effects of the COVID vaccination. She is improving and was advised to continue tylenol or Ibuprofen as needed. She was also advised she needs to see her PCP for her elevated BGL and evaluation for possible medications for DM. Symptomatic relief discussed including Tylenol prn pain/fever. Schedule f/u with PCP in 7-10 days if symptoms persist. ED sooner if symptoms worsen or change. ED immediately with high or refractory fever, progressive SOB, dyspnea, CP, calf pain/swelling, shaking chills, vomiting, abdominal pain, lethargy, flank pain, or decreased urinary output. Pt verbalizes understanding and is in agreement with plan of care. All questions answered. Jose Banegas, GERALDINE - CNP    This visit was provided as a focused evaluation during the COVID -19 pandemic/national emergency. A comprehensive review of all previous patient history and testing was not conducted. Pertinent findings were elicited during the visit. *NOTE: This report was transcribed using voice recognition software. Every effort was made to ensure accuracy; however, inadvertent computerized transcription errors may be present.

## 2021-01-10 LAB
EKG ATRIAL RATE: 111 BPM
EKG P AXIS: 65 DEGREES
EKG P-R INTERVAL: 160 MS
EKG Q-T INTERVAL: 338 MS
EKG QRS DURATION: 94 MS
EKG QTC CALCULATION (BAZETT): 459 MS
EKG R AXIS: -2 DEGREES
EKG T AXIS: 40 DEGREES
EKG VENTRICULAR RATE: 111 BPM

## 2021-09-15 LAB
CHOLESTEROL, TOTAL: 246 MG/DL (ref 0–199)
GLUCOSE BLD-MCNC: 237 MG/DL (ref 74–107)
HDLC SERPL-MCNC: 35 MG/DL
LDL CHOLESTEROL CALCULATED: 171 MG/DL (ref 0–99)
TRIGL SERPL-MCNC: 202 MG/DL (ref 0–149)

## 2021-09-16 LAB — HBA1C MFR BLD: 9.8 % (ref 4–5.6)

## 2022-02-18 ENCOUNTER — OFFICE VISIT (OUTPATIENT)
Dept: PRIMARY CARE CLINIC | Age: 47
End: 2022-02-18
Payer: COMMERCIAL

## 2022-02-18 VITALS
SYSTOLIC BLOOD PRESSURE: 126 MMHG | HEART RATE: 114 BPM | RESPIRATION RATE: 18 BRPM | TEMPERATURE: 97.7 F | OXYGEN SATURATION: 97 % | DIASTOLIC BLOOD PRESSURE: 83 MMHG | BODY MASS INDEX: 36.65 KG/M2 | HEIGHT: 65 IN | WEIGHT: 220 LBS

## 2022-02-18 DIAGNOSIS — B34.9 VIRAL INFECTION: ICD-10-CM

## 2022-02-18 DIAGNOSIS — R50.9 FEVER, UNSPECIFIED FEVER CAUSE: ICD-10-CM

## 2022-02-18 DIAGNOSIS — R52 BODY ACHES: ICD-10-CM

## 2022-02-18 LAB
INFLUENZA A ANTIGEN, POC: NEGATIVE
INFLUENZA B ANTIGEN, POC: NEGATIVE
Lab: NORMAL
PERFORMING INSTRUMENT: NORMAL
QC PASS/FAIL: NORMAL
SARS-COV-2, POC: NORMAL

## 2022-02-18 PROCEDURE — 87426 SARSCOV CORONAVIRUS AG IA: CPT | Performed by: NURSE PRACTITIONER

## 2022-02-18 PROCEDURE — 99213 OFFICE O/P EST LOW 20 MIN: CPT | Performed by: NURSE PRACTITIONER

## 2022-02-18 PROCEDURE — 87804 INFLUENZA ASSAY W/OPTIC: CPT | Performed by: NURSE PRACTITIONER

## 2022-02-18 NOTE — PROGRESS NOTES
Chief Complaint:   Fever (yesterday) and Generalized Body Aches (yesterday)      History of Present Illness   Source of history provided by:  patient. Joselyn Christiansen is a 51 y.o. old female with a past medical history of:   Past Medical History:   Diagnosis Date    Acute medial meniscus tear of right knee 6/3/2015    MRI 2015     Anxiety     Depression     Depression     Marital problems. Previously on Lexapro.  Headache(784.0)     HTN     Previously on medication.  HTN     Migraine     Preeclampsia     Previous 2 pregnancies.  Preeclampsia         Pt presents to the [facility] care with fevers and body aches for the past 1 day. Pt stated her temperature yesterday was 102.8 and this morning was 101. 7. pt stated she did take tylenol to reduce fevers. Denies any N/V/D, abdominal pain, CP, progressive SOB, dizziness, or lethargy. Pt denies any s/s of UTI        ROS    Unless otherwise stated in this report or unable to obtain because of the patient's clinical or mental status as evidenced by the medical record, this patients's positive and negative responses for Review of Systems, constitutional, psych, eyes, ENT, cardiovascular, respiratory, gastrointestinal, neurological, genitourinary, musculoskeletal, integument systems and systems related to the presenting problem are either stated in the preceding or were not pertinent or were negative for the symptoms and/or complaints related to the medical problem. Past Surgical History:  has a past surgical history that includes  section (); Tubal ligation; Tubal ligation (); LEEP (); and Hysterectomy. Social History:  reports that she has never smoked. She has never used smokeless tobacco. She reports that she does not drink alcohol and does not use drugs.   Family History: family history includes Breast Cancer in her mother; Cancer in her father; Cancer (age of onset: 32) in her sister; Diabetes in her mother; Heart Disease (age of onset: 48) in her father; High Blood Pressure in her mother; Thyroid Disease in her mother. Allergies: Bee venom and Onion    Physical Exam         VS:  /83 (Site: Left Upper Arm, Position: Sitting, Cuff Size: Large Adult)   Pulse 114   Temp 97.7 °F (36.5 °C) (Temporal)   Resp 18   Ht 5' 4.5\" (1.638 m)   Wt 220 lb (99.8 kg)   LMP 03/01/2012   SpO2 97%   BMI 37.18 kg/m²    Oxygen Saturation Interpretation: Normal.    Constitutional:  Alert, development consistent with age. Ears:  External Ears: Normal bilateral pinna. TM's & External Canals: TM's normal bilaterally without perforation. Canals without erythema or drainage. Nose:   There is no obvious septal defect. Mouth:  Moist bucca mucosa and normal tongue. Throat: No posterior pharyngeal erythema, exudates or lesions. Neck:  Supple. There is no obvious adenopathy or neck tenderness. Lungs:   Breath sounds: Normal chest expansion and breath sounds noted throughout. no wheezes, rales, or rhonchi noted. Heart:  Regular rate and rhythm, normal heart sounds, without pathological murmurs, ectopy, gallops, or rubs. Skin:  Normal turgor. Warm, dry, without visible rash. Neurological:  Oriented. Motor functions intact. Lab / Imaging Results   (All laboratory and radiology results have been personally reviewed by myself)  Labs:  Results for orders placed or performed in visit on 02/18/22   POCT Influenza A/B Antigen   Result Value Ref Range    Inflenza A Ag Negative     Influenza B Ag Negative    POCT COVID-19, Antigen   Result Value Ref Range    SARS-COV-2, POC Not-Detected Not Detected    Lot Number [id number]     QC Pass/Fail pass     Performing Instrument BD Veritor        Imaging: All Radiology results interpreted by Radiologist unless otherwise noted. No orders to display         Assessment / Plan     Impression(s):  1. Viral infection    2. Body aches    3.  Fever, unspecified fever cause Disposition:  Disposition: Advised covid and Influenza A/B are negative, stay well hydrated, continue Tylenol for fevers, advised if any worsening go to ED

## 2022-09-16 LAB
CHOLESTEROL, TOTAL: 295 MG/DL (ref 0–199)
GLUCOSE BLD-MCNC: 333 MG/DL (ref 74–107)
HBA1C MFR BLD: 12.1 % (ref 4–5.6)
HDLC SERPL-MCNC: 40 MG/DL
LDL CHOLESTEROL CALCULATED: 201 MG/DL (ref 0–99)
TRIGL SERPL-MCNC: 269 MG/DL (ref 0–149)

## 2023-05-08 ENCOUNTER — OFFICE VISIT (OUTPATIENT)
Dept: PRIMARY CARE CLINIC | Age: 48
End: 2023-05-08
Payer: COMMERCIAL

## 2023-05-08 VITALS — SYSTOLIC BLOOD PRESSURE: 129 MMHG | TEMPERATURE: 98.5 F | DIASTOLIC BLOOD PRESSURE: 88 MMHG | HEART RATE: 105 BPM

## 2023-05-08 DIAGNOSIS — H10.32 ACUTE BACTERIAL CONJUNCTIVITIS OF LEFT EYE: Primary | ICD-10-CM

## 2023-05-08 PROCEDURE — 3074F SYST BP LT 130 MM HG: CPT | Performed by: NURSE PRACTITIONER

## 2023-05-08 PROCEDURE — 99213 OFFICE O/P EST LOW 20 MIN: CPT | Performed by: NURSE PRACTITIONER

## 2023-05-08 PROCEDURE — 3079F DIAST BP 80-89 MM HG: CPT | Performed by: NURSE PRACTITIONER

## 2023-05-08 RX ORDER — ERYTHROMYCIN 5 MG/G
OINTMENT OPHTHALMIC
Qty: 3.5 G | Refills: 0 | Status: SHIPPED | OUTPATIENT
Start: 2023-05-08 | End: 2023-05-18

## 2023-05-08 NOTE — PROGRESS NOTES
Chief Complaint:   Conjunctivitis (Left)      History of Present Illness   Source of history provided by:  patient. Hemanth Castillo is a 49 y.o. old female with a past medical history of:   Past Medical History:   Diagnosis Date    Acute medial meniscus tear of right knee 6/3/2015    MRI 2015     Anxiety     Depression     Depression     Marital problems. Previously on Lexapro. Headache(784.0)     HTN     Previously on medication. HTN     Migraine     Preeclampsia     Previous 2 pregnancies. Preeclampsia        Pt  presents to the [facility] care with left eye redness/watering and yellow drainage/crusting  for the past several days. No fever noted. .  Denies any N/V/D, visual deficits, sore throat, congestion, abdominal pain, dizziness, or lethargy. ROS    Unless otherwise stated in this report or unable to obtain because of the patient's clinical or mental status as evidenced by the medical record, this patients's positive and negative responses for Review of Systems, constitutional, psych, eyes, ENT, cardiovascular, respiratory, gastrointestinal, neurological, genitourinary, musculoskeletal, integument systems and systems related to the presenting problem are either stated in the preceding or were not pertinent or were negative for the symptoms and/or complaints related to the medical problem. Past Surgical History:  has a past surgical history that includes  section (); Tubal ligation; Tubal ligation (); LEEP (); and Hysterectomy. Social History:  reports that she has never smoked. She has never used smokeless tobacco. She reports that she does not drink alcohol and does not use drugs. Family History: family history includes Breast Cancer in her mother; Cancer in her father; Cancer (age of onset: 32) in her sister; Diabetes in her mother; Heart Disease (age of onset: 48) in her father; High Blood Pressure in her mother; Thyroid Disease in her mother.

## 2023-09-19 LAB
CHOLEST SERPL-MCNC: 272 MG/DL
GLUCOSE SERPL-MCNC: 332 MG/DL (ref 74–99)
HDLC SERPL-MCNC: 38 MG/DL
LDLC SERPL CALC-MCNC: 194 MG/DL
PATIENT FASTING?: YES
TRIGL SERPL-MCNC: 200 MG/DL
VLDLC SERPL CALC-MCNC: 40 MG/DL

## 2023-09-21 LAB — HBA1C MFR BLD: 12.1 % (ref 4–5.6)

## 2023-10-02 ENCOUNTER — HOSPITAL ENCOUNTER (EMERGENCY)
Age: 48
Discharge: HOME | End: 2023-10-02
Payer: OTHER MISCELLANEOUS

## 2023-10-02 ENCOUNTER — APPOINTMENT (OUTPATIENT)
Dept: CT IMAGING | Age: 48
End: 2023-10-02
Payer: OTHER MISCELLANEOUS

## 2023-10-02 VITALS
DIASTOLIC BLOOD PRESSURE: 86 MMHG | SYSTOLIC BLOOD PRESSURE: 137 MMHG | HEART RATE: 92 BPM | TEMPERATURE: 97.7 F | HEIGHT: 64 IN | OXYGEN SATURATION: 98 % | WEIGHT: 197 LBS | RESPIRATION RATE: 18 BRPM | BODY MASS INDEX: 33.63 KG/M2

## 2023-10-02 DIAGNOSIS — S16.1XXA STRAIN OF NECK MUSCLE, INITIAL ENCOUNTER: ICD-10-CM
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
## 2023-10-02 PROCEDURE — 72125 CT NECK SPINE W/O DYE: CPT

## 2023-10-02 PROCEDURE — 99284 EMERGENCY DEPT VISIT MOD MDM: CPT

## 2023-10-02 ASSESSMENT — PAIN - FUNCTIONAL ASSESSMENT: PAIN_FUNCTIONAL_ASSESSMENT: 0-10

## 2023-10-02 ASSESSMENT — PAIN DESCRIPTION - ONSET: ONSET: SUDDEN

## 2023-10-02 ASSESSMENT — PAIN DESCRIPTION - LOCATION: LOCATION: NECK

## 2023-10-02 ASSESSMENT — PAIN DESCRIPTION - PAIN TYPE: TYPE: ACUTE PAIN

## 2023-10-02 ASSESSMENT — PAIN DESCRIPTION - FREQUENCY: FREQUENCY: CONTINUOUS

## 2023-10-02 ASSESSMENT — PAIN SCALES - GENERAL: PAINLEVEL_OUTOF10: 4

## 2023-10-02 ASSESSMENT — PAIN DESCRIPTION - DESCRIPTORS: DESCRIPTORS: SORE;DISCOMFORT
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
## 2023-10-08 ENCOUNTER — HOSPITAL ENCOUNTER (EMERGENCY)
Age: 48
Discharge: HOME | End: 2023-10-08
Payer: COMMERCIAL

## 2023-10-08 VITALS
DIASTOLIC BLOOD PRESSURE: 92 MMHG | HEART RATE: 110 BPM | OXYGEN SATURATION: 98 % | TEMPERATURE: 98.1 F | RESPIRATION RATE: 18 BRPM | SYSTOLIC BLOOD PRESSURE: 143 MMHG

## 2023-10-08 DIAGNOSIS — L02.91 ABSCESS: ICD-10-CM

## 2023-10-08 DIAGNOSIS — H01.003 BLEPHARITIS OF EYELID OF RIGHT EYE, UNSPECIFIED EYELID, UNSPECIFIED TYPE: Primary | ICD-10-CM

## 2023-10-08 PROCEDURE — 99211 OFF/OP EST MAY X REQ PHY/QHP: CPT

## 2023-10-08 RX ORDER — DOXYCYCLINE HYCLATE 100 MG
100 TABLET ORAL 2 TIMES DAILY
Qty: 20 TABLET | Refills: 0 | Status: SHIPPED | OUTPATIENT
Start: 2023-10-08 | End: 2023-10-18

## 2023-10-08 ASSESSMENT — VISUAL ACUITY: OU: 1

## 2024-02-06 ENCOUNTER — OFFICE VISIT (OUTPATIENT)
Dept: PRIMARY CARE CLINIC | Age: 49
End: 2024-02-06
Payer: COMMERCIAL

## 2024-02-06 VITALS
BODY MASS INDEX: 33.63 KG/M2 | TEMPERATURE: 98.6 F | OXYGEN SATURATION: 98 % | HEIGHT: 64 IN | HEART RATE: 113 BPM | SYSTOLIC BLOOD PRESSURE: 143 MMHG | DIASTOLIC BLOOD PRESSURE: 91 MMHG | WEIGHT: 197 LBS | RESPIRATION RATE: 16 BRPM

## 2024-02-06 DIAGNOSIS — R39.9 UTI SYMPTOMS: ICD-10-CM

## 2024-02-06 DIAGNOSIS — N30.01 ACUTE CYSTITIS WITH HEMATURIA: Primary | ICD-10-CM

## 2024-02-06 LAB
BILIRUBIN, POC: ABNORMAL
BLOOD URINE, POC: ABNORMAL
CLARITY, POC: ABNORMAL
COLOR, POC: ABNORMAL
GLUCOSE URINE, POC: ABNORMAL
KETONES, POC: ABNORMAL
LEUKOCYTE EST, POC: ABNORMAL
NITRITE, POC: POSITIVE
PH, POC: 5.5
PROTEIN, POC: ABNORMAL
SPECIFIC GRAVITY, POC: 1.02
UROBILINOGEN, POC: 1

## 2024-02-06 PROCEDURE — 99213 OFFICE O/P EST LOW 20 MIN: CPT | Performed by: NURSE PRACTITIONER

## 2024-02-06 PROCEDURE — 3077F SYST BP >= 140 MM HG: CPT | Performed by: NURSE PRACTITIONER

## 2024-02-06 PROCEDURE — 81002 URINALYSIS NONAUTO W/O SCOPE: CPT | Performed by: NURSE PRACTITIONER

## 2024-02-06 PROCEDURE — 3080F DIAST BP >= 90 MM HG: CPT | Performed by: NURSE PRACTITIONER

## 2024-02-06 RX ORDER — SULFAMETHOXAZOLE AND TRIMETHOPRIM 800; 160 MG/1; MG/1
1 TABLET ORAL 2 TIMES DAILY
Qty: 14 TABLET | Refills: 0 | Status: SHIPPED | OUTPATIENT
Start: 2024-02-06 | End: 2024-02-13

## 2024-02-06 NOTE — PROGRESS NOTES
Chief Complaint:   Hematuria (Patient here for Hematuria x1 day.)    History of Present Illness   Source of history provided by:  patient.    Caitlin Acevedo is a 48 y.o. old female who presents to [facility] for blood in urine, which occured 1 day(s) prior to arrival. Symptoms are associated with low back pain.  She has a history of no complicating symptoms. Denies gross hematuria.  Denies associated flank pain. Denies any fever, chills, vaginal discharge, vaginal bleeding, possibility of pregnancy, vomiting, diarrhea, or lethargy.  Patient's last menstrual period was 2012.    Review of Systems   Unless otherwise stated in this report or unable to obtain because of the patient's clinical or mental status as evidenced by the medical record, this patients's positive and negative responses for Review of Systems, constitutional, psych, eyes, ENT, cardiovascular, respiratory, gastrointestinal, neurological, genitourinary, musculoskeletal, integument systems and systems related to the presenting problem are either stated in the preceding or were not pertinent or were negative for the symptoms and/or complaints related to the medical problem.    Past Medical History:  has a past medical history of Acute medial meniscus tear of right knee, Anxiety, Depression, Depression, Headache(784.0), HTN, HTN, Migraine, Preeclampsia, and Preeclampsia.   Past Surgical History:  has a past surgical history that includes  section (); Tubal ligation; Tubal ligation (); LEEP (); and Hysterectomy.  Social History:  reports that she has never smoked. She has never used smokeless tobacco. She reports that she does not drink alcohol and does not use drugs.  Family History: family history includes Breast Cancer in her mother; Cancer in her father; Cancer (age of onset: 27) in her sister; Diabetes in her mother; Heart Disease (age of onset: 50) in her father; High Blood Pressure in her mother; Thyroid Disease in her

## 2024-02-09 LAB
CULTURE: ABNORMAL
SPECIMEN DESCRIPTION: ABNORMAL

## 2024-09-11 LAB
CHOLEST SERPL-MCNC: 290 MG/DL
GLUCOSE SERPL-MCNC: 349 MG/DL (ref 74–99)
HDLC SERPL-MCNC: 37 MG/DL
LDLC SERPL CALC-MCNC: 211 MG/DL
PATIENT FASTING?: YES
TRIGL SERPL-MCNC: 210 MG/DL
VLDLC SERPL CALC-MCNC: 42 MG/DL

## 2024-09-13 LAB — HBA1C MFR BLD: 12.9 % (ref 4–5.6)

## 2025-05-29 LAB
CHOLEST SERPL-MCNC: 287 MG/DL
GLUCOSE SERPL-MCNC: 349 MG/DL (ref 74–99)
HBA1C MFR BLD: 12.4 % (ref 4–5.6)
HDLC SERPL-MCNC: 41 MG/DL
LDLC SERPL CALC-MCNC: 209 MG/DL
PATIENT FASTING?: YES
TRIGL SERPL-MCNC: 184 MG/DL
VLDLC SERPL CALC-MCNC: 37 MG/DL

## 2025-08-29 ENCOUNTER — TELEPHONE (OUTPATIENT)
Dept: FAMILY MEDICINE CLINIC | Age: 50
End: 2025-08-29

## 2025-08-29 ENCOUNTER — OFFICE VISIT (OUTPATIENT)
Dept: FAMILY MEDICINE CLINIC | Age: 50
End: 2025-08-29

## 2025-08-29 ENCOUNTER — NURSE TRIAGE (OUTPATIENT)
Dept: OTHER | Facility: CLINIC | Age: 50
End: 2025-08-29

## 2025-08-29 VITALS
BODY MASS INDEX: 29.88 KG/M2 | WEIGHT: 175 LBS | SYSTOLIC BLOOD PRESSURE: 138 MMHG | HEIGHT: 64 IN | RESPIRATION RATE: 18 BRPM | OXYGEN SATURATION: 98 % | TEMPERATURE: 98.1 F | HEART RATE: 114 BPM | DIASTOLIC BLOOD PRESSURE: 82 MMHG

## 2025-08-29 DIAGNOSIS — E11.9 TYPE 2 DIABETES MELLITUS WITHOUT COMPLICATION, WITHOUT LONG-TERM CURRENT USE OF INSULIN (HCC): Primary | ICD-10-CM

## 2025-08-29 DIAGNOSIS — R31.9 HEMATURIA, UNSPECIFIED TYPE: ICD-10-CM

## 2025-08-29 DIAGNOSIS — R31.9 HEMATURIA, UNSPECIFIED TYPE: Primary | ICD-10-CM

## 2025-08-29 DIAGNOSIS — R73.9 HYPERGLYCEMIA: ICD-10-CM

## 2025-08-29 LAB
ALBUMIN: 4.3 G/DL (ref 3.5–5.2)
ALP BLD-CCNC: 115 U/L (ref 35–104)
ALT SERPL-CCNC: 13 U/L (ref 0–35)
ANION GAP SERPL CALCULATED.3IONS-SCNC: 15 MMOL/L (ref 7–16)
AST SERPL-CCNC: 18 U/L (ref 0–35)
BILIRUB SERPL-MCNC: 0.6 MG/DL (ref 0–1.2)
BILIRUBIN, POC: NEGATIVE
BLOOD URINE, POC: NORMAL
BUN BLDV-MCNC: 13 MG/DL (ref 6–20)
CALCIUM SERPL-MCNC: 9.9 MG/DL (ref 8.6–10)
CHLORIDE BLD-SCNC: 93 MMOL/L (ref 98–107)
CLARITY, POC: NORMAL
CO2: 25 MMOL/L (ref 22–29)
COLOR, POC: NORMAL
CREAT SERPL-MCNC: 0.5 MG/DL (ref 0.5–1)
GFR, ESTIMATED: >90 ML/MIN/1.73M2
GLUCOSE BLD-MCNC: 455 MG/DL (ref 74–99)
GLUCOSE URINE, POC: NORMAL MG/DL
KETONES, POC: NEGATIVE MG/DL
LEUKOCYTE EST, POC: NORMAL
NITRITE, POC: NEGATIVE
PH, POC: 6
POTASSIUM SERPL-SCNC: 4.4 MMOL/L (ref 3.5–5.1)
PROTEIN, POC: NEGATIVE MG/DL
SODIUM BLD-SCNC: 132 MMOL/L (ref 136–145)
SPECIFIC GRAVITY, POC: 1.01
TOTAL PROTEIN: 7.1 G/DL (ref 6.4–8.3)
UROBILINOGEN, POC: NORMAL MG/DL

## 2025-08-29 RX ORDER — METFORMIN HYDROCHLORIDE 500 MG/1
500 TABLET, EXTENDED RELEASE ORAL
Qty: 90 TABLET | Refills: 0 | Status: SHIPPED | OUTPATIENT
Start: 2025-08-29

## 2025-08-29 RX ORDER — CEPHALEXIN 500 MG/1
500 CAPSULE ORAL 2 TIMES DAILY
Qty: 14 CAPSULE | Refills: 0 | Status: SHIPPED | OUTPATIENT
Start: 2025-08-29 | End: 2025-09-05

## 2025-08-29 RX ORDER — ACYCLOVIR 400 MG/1
1 TABLET ORAL
Qty: 10 EACH | Refills: 3 | Status: SHIPPED | OUTPATIENT
Start: 2025-08-29

## 2025-08-29 RX ORDER — ACYCLOVIR 400 MG/1
1 TABLET ORAL
Qty: 1 EACH | Refills: 0 | Status: SHIPPED | OUTPATIENT
Start: 2025-08-29

## 2025-08-31 LAB
CULTURE: ABNORMAL
SPECIMEN DESCRIPTION: ABNORMAL

## 2025-09-03 ENCOUNTER — PATIENT MESSAGE (OUTPATIENT)
Dept: FAMILY MEDICINE CLINIC | Age: 50
End: 2025-09-03

## 2025-09-03 RX ORDER — GLUCOSAMINE HCL/CHONDROITIN SU 500-400 MG
CAPSULE ORAL
Qty: 100 STRIP | Refills: 3 | Status: SHIPPED | OUTPATIENT
Start: 2025-09-03

## 2025-09-03 RX ORDER — BLOOD PRESSURE TEST KIT
KIT MISCELLANEOUS
Qty: 300 EACH | Refills: 3 | Status: SHIPPED | OUTPATIENT
Start: 2025-09-03

## 2025-09-03 RX ORDER — AVOBENZONE, HOMOSALATE, OCTISALATE, OCTOCRYLENE 30; 40; 45; 26 MG/ML; MG/ML; MG/ML; MG/ML
CREAM TOPICAL
Qty: 100 EACH | Refills: 3 | Status: SHIPPED | OUTPATIENT
Start: 2025-09-03

## 2025-09-03 RX ORDER — BLOOD-GLUCOSE METER
1 KIT MISCELLANEOUS DAILY
Qty: 1 KIT | Refills: 0 | Status: SHIPPED | OUTPATIENT
Start: 2025-09-03